# Patient Record
Sex: FEMALE | Race: BLACK OR AFRICAN AMERICAN | NOT HISPANIC OR LATINO | Employment: UNEMPLOYED | URBAN - METROPOLITAN AREA
[De-identification: names, ages, dates, MRNs, and addresses within clinical notes are randomized per-mention and may not be internally consistent; named-entity substitution may affect disease eponyms.]

---

## 2023-01-25 ENCOUNTER — OFFICE VISIT (OUTPATIENT)
Dept: OBSTETRICS AND GYNECOLOGY | Facility: CLINIC | Age: 23
End: 2023-01-25
Payer: MEDICAID

## 2023-01-25 VITALS
BODY MASS INDEX: 41.83 KG/M2 | WEIGHT: 245 LBS | SYSTOLIC BLOOD PRESSURE: 128 MMHG | DIASTOLIC BLOOD PRESSURE: 66 MMHG | HEART RATE: 99 BPM | HEIGHT: 64 IN

## 2023-01-25 DIAGNOSIS — Z28.39 RUBELLA NON-IMMUNE STATUS, ANTEPARTUM: ICD-10-CM

## 2023-01-25 DIAGNOSIS — Z3A.17 17 WEEKS GESTATION OF PREGNANCY: Primary | ICD-10-CM

## 2023-01-25 DIAGNOSIS — N89.8 VAGINAL LESION: ICD-10-CM

## 2023-01-25 DIAGNOSIS — O09.899 RUBELLA NON-IMMUNE STATUS, ANTEPARTUM: ICD-10-CM

## 2023-01-25 DIAGNOSIS — O09.30 INSUFFICIENT ANTEPARTUM CARE: ICD-10-CM

## 2023-01-25 LAB
AMPHET+METHAMPHET UR QL: NEGATIVE
BARBITURATES UR QL SCN>200 NG/ML: NEGATIVE
BENZODIAZ UR QL SCN>200 NG/ML: NEGATIVE
BZE UR QL SCN: NEGATIVE
CANNABINOIDS UR QL SCN: ABNORMAL
CREAT UR-MCNC: 166.8 MG/DL (ref 15–325)
METHADONE UR QL SCN>300 NG/ML: NEGATIVE
OPIATES UR QL SCN: NEGATIVE
PCP UR QL SCN>25 NG/ML: NEGATIVE
TOXICOLOGY INFORMATION: ABNORMAL

## 2023-01-25 PROCEDURE — 3008F PR BODY MASS INDEX (BMI) DOCUMENTED: ICD-10-PCS | Mod: CPTII,S$GLB,,

## 2023-01-25 PROCEDURE — 86694 HERPES SIMPLEX NES ANTBDY: CPT

## 2023-01-25 PROCEDURE — 80307 DRUG TEST PRSMV CHEM ANLYZR: CPT

## 2023-01-25 PROCEDURE — 3078F DIAST BP <80 MM HG: CPT | Mod: CPTII,S$GLB,,

## 2023-01-25 PROCEDURE — 3008F BODY MASS INDEX DOCD: CPT | Mod: CPTII,S$GLB,,

## 2023-01-25 PROCEDURE — 99203 PR OFFICE/OUTPT VISIT, NEW, LEVL III, 30-44 MIN: ICD-10-PCS | Mod: TH,S$GLB,,

## 2023-01-25 PROCEDURE — 82105 ALPHA-FETOPROTEIN SERUM: CPT

## 2023-01-25 PROCEDURE — 3074F PR MOST RECENT SYSTOLIC BLOOD PRESSURE < 130 MM HG: ICD-10-PCS | Mod: CPTII,S$GLB,,

## 2023-01-25 PROCEDURE — 80074 ACUTE HEPATITIS PANEL: CPT

## 2023-01-25 PROCEDURE — 86696 HERPES SIMPLEX TYPE 2 TEST: CPT

## 2023-01-25 PROCEDURE — 3074F SYST BP LT 130 MM HG: CPT | Mod: CPTII,S$GLB,,

## 2023-01-25 PROCEDURE — 85660 RBC SICKLE CELL TEST: CPT

## 2023-01-25 PROCEDURE — 87086 URINE CULTURE/COLONY COUNT: CPT

## 2023-01-25 PROCEDURE — 99203 OFFICE O/P NEW LOW 30 MIN: CPT | Mod: TH,S$GLB,,

## 2023-01-25 PROCEDURE — 86592 SYPHILIS TEST NON-TREP QUAL: CPT

## 2023-01-25 PROCEDURE — 3078F PR MOST RECENT DIASTOLIC BLOOD PRESSURE < 80 MM HG: ICD-10-PCS | Mod: CPTII,S$GLB,,

## 2023-01-25 PROCEDURE — 86762 RUBELLA ANTIBODY: CPT

## 2023-01-25 PROCEDURE — 87591 N.GONORRHOEAE DNA AMP PROB: CPT

## 2023-01-25 PROCEDURE — 1159F PR MEDICATION LIST DOCUMENTED IN MEDICAL RECORD: ICD-10-PCS | Mod: CPTII,S$GLB,,

## 2023-01-25 PROCEDURE — 1159F MED LIST DOCD IN RCRD: CPT | Mod: CPTII,S$GLB,,

## 2023-01-25 PROCEDURE — 87389 HIV-1 AG W/HIV-1&-2 AB AG IA: CPT

## 2023-01-25 NOTE — PROGRESS NOTES
"2023  23 y.o. 17w2d per u/s at 8 weeks EGA with [facility]. ROGER 2023. Dating u/s report requested at today's visit.   OB History    Para Term  AB Living   1             SAB IAB Ectopic Multiple Live Births                  # Outcome Date GA Lbr Bennie/2nd Weight Sex Delivery Anes PTL Lv   1 Current              Pt reports she had ultrasound with [facility] when she was 8 weeks EGA. She was provided ROGER of 2023. PIERRE completed to obtain ultrasound report.     Here for scheduled Initial OB visit. Doing well.  No lof/brvb, dysuria, fever/chills, nausea or emesis. No S&S of pre eclampsia or COIVD 19. Good FM noted. No cramps/regular contractions. Calm, pleasant, NAD. ROS negative with exception of aforementioned:    Patient reports recurrent vaginal bumps that come and go. Reports as not painful. No vaginal bumps at this time.  We reviewed this could likely be HSV. Discussed need for serum HSV labs. If positive patient will need suppression therapy starting approx 34-36 weeks.     Allergies:  None    PMH:  Denies major medical illness or injury    Surghx:  None    OBHX:    Late entry to care at 17 weeks  BMI 40 at Initial OB. Plan growth 32 weeks; weekly BPP beginning at 34 weeks.   Reports recurrent "vaginal bumps" that come and go. 2 episodes this pregnancy. Discussed need to collect HSV labs today. Pt verbalizes understanding.   Rubella Non-immune    Review of Systems:  General ROS: negative for headache or visual changes  Breast ROS: negative for breast lumps  Gastrointestinal ROS: negative for constipation, diarrhea or nausea/vomiting  Musculoskeletal ROS: negative for pain in joints or swelling in face or hands.   Neurological ROS: negative for - headaches, numbness/tingling or visual changes      Physical Exam:  /66   Pulse 99   Ht 5' 4" (1.626 m)   Wt 111.1 kg (245 lb)   LMP 2022 (Approximate)   BMI 42.05 kg/m²   FHT:  " 140s    Constitutional: She is oriented to person, place, and time. She appears well-developed and well-nourished. No distress.   Pulmonary/Chest: Effort normal. No respiratory distress  Abdominal: Soft, gravid, nontender. No rebound and no guarding. Fundal Height:  S=D  Genitourinary: Deferred   Musculoskeletal: Normal range of motion. Minimal peripheral edema.   Neurological: She is alert and oriented to person, place, and time. Coordination normal. Gait smooth and steady  Skin: Skin is warm and dry. She is not diaphoretic.  Psychiatric: She has a normal mood and affect.      Assessment:   23 y.o., at 17w2d  Patient Active Problem List   Diagnosis    17 weeks gestation of pregnancy    Rubella non-immune status, antepartum    Vaginal lesion    Insufficient antepartum care     No current outpatient medications on file prior to visit.     No current facility-administered medications on file prior to visit.       Plan:  Oriented to Midwifery Care.   S&S of SAB, PTL/PPROM.  Continue home meds/daily PNV.  OB panel + NIPT + AFP today.  Anatomy u/s ordered.   [facility]   JANET 4 weeks.

## 2023-01-27 DIAGNOSIS — O99.019 ANTEPARTUM ANEMIA: Primary | ICD-10-CM

## 2023-01-27 PROBLEM — N89.8 VAGINAL LESION: Status: ACTIVE | Noted: 2023-01-27

## 2023-01-27 PROBLEM — Z28.39 RUBELLA NON-IMMUNE STATUS, ANTEPARTUM: Status: ACTIVE | Noted: 2023-01-27

## 2023-01-27 PROBLEM — O09.899 RUBELLA NON-IMMUNE STATUS, ANTEPARTUM: Status: ACTIVE | Noted: 2023-01-27

## 2023-01-27 PROBLEM — O09.30 INSUFFICIENT ANTEPARTUM CARE: Status: ACTIVE | Noted: 2023-01-27

## 2023-01-27 LAB
BACTERIA UR CULT: NORMAL
BACTERIA UR CULT: NORMAL

## 2023-01-27 RX ORDER — FERROUS SULFATE 325(65) MG
325 TABLET, DELAYED RELEASE (ENTERIC COATED) ORAL DAILY
Qty: 30 TABLET | Refills: 11 | Status: SHIPPED | OUTPATIENT
Start: 2023-01-27 | End: 2023-03-13 | Stop reason: SDUPTHER

## 2023-01-28 LAB
C TRACH DNA SPEC QL NAA+PROBE: NOT DETECTED
N GONORRHOEA DNA SPEC QL NAA+PROBE: NOT DETECTED

## 2023-01-30 ENCOUNTER — TELEPHONE (OUTPATIENT)
Dept: OBSTETRICS AND GYNECOLOGY | Facility: CLINIC | Age: 23
End: 2023-01-30
Payer: MEDICAID

## 2023-01-30 DIAGNOSIS — B00.9 HUMAN HERPES SIMPLEX VIRUS TYPE 1 (HSV-1) DNA DETECTED: ICD-10-CM

## 2023-01-30 DIAGNOSIS — R76.8 HSV-2 SEROPOSITIVE: Primary | ICD-10-CM

## 2023-01-30 RX ORDER — VALACYCLOVIR HYDROCHLORIDE 500 MG/1
500 TABLET, FILM COATED ORAL 2 TIMES DAILY
Qty: 60 TABLET | Refills: 11 | Status: SHIPPED | OUTPATIENT
Start: 2023-01-30 | End: 2023-03-13 | Stop reason: SDUPTHER

## 2023-01-30 NOTE — TELEPHONE ENCOUNTER
I spoke with patient on the phone about +HSV 1&2 diagnosis. Pt very sad and distraught on the phone. We discussed HSV is a dormant virus that often presents in times of stress, low immunity such as pregnancy. Modes of transmission discussed. Treatment and suppression therapy reviewed. Pt desires to start suppression therapy today. Pt verbalizes understanding of teaching.   Pt still feeling upset, states this is her first relationship and she has been tested prior with negative results.   Reviewed HSV status often unknown by most people and HSV highly common virus. I encouraged patient to speak with friend or family member she trusts. Reviewed patient can reach out if she desires to discuss further or has any questions.

## 2023-01-30 NOTE — TELEPHONE ENCOUNTER
Pt called inquiring of HSV results. Not yet reviewed by provider. Pt notified of message sent and VU.

## 2023-02-10 ENCOUNTER — TELEPHONE (OUTPATIENT)
Dept: OBSTETRICS AND GYNECOLOGY | Facility: CLINIC | Age: 23
End: 2023-02-10
Payer: MEDICAID

## 2023-02-10 ENCOUNTER — PATIENT MESSAGE (OUTPATIENT)
Dept: OBSTETRICS AND GYNECOLOGY | Facility: CLINIC | Age: 23
End: 2023-02-10
Payer: MEDICAID

## 2023-02-10 NOTE — TELEPHONE ENCOUNTER
I called patient to review questions, no answer, voice mail box disabled. Pt portal message sent to patient.

## 2023-02-10 NOTE — TELEPHONE ENCOUNTER
----- Message from Lakeshia Palacios MA sent at 2/8/2023  4:31 PM CST -----  Please advise  ----- Message -----  From: Junaid Solorio  Sent: 2/8/2023   3:11 PM CST  To: Li [facility] Staff    Type: Needs Medical Advice  Who Called:  Pt  Symptoms (please be specific):  RX side effects   How long has patient had these symptoms:  2 days  Pharmacy name and phone #:    Best Call Back Number: [phone]     Additional Information: Sts that she just got the pills for her Herpes and is experiencing cramping a lot and its just her 2nd day taking them.  Wants to make sure that is okay and it won't affect the baby.  Also has a few other questions.  Please advise- - thank you

## 2023-02-24 ENCOUNTER — PATIENT MESSAGE (OUTPATIENT)
Dept: OBSTETRICS AND GYNECOLOGY | Facility: CLINIC | Age: 23
End: 2023-02-24
Payer: MEDICAID

## 2023-02-28 ENCOUNTER — PROCEDURE VISIT (OUTPATIENT)
Dept: MATERNAL FETAL MEDICINE | Facility: CLINIC | Age: 23
End: 2023-02-28
Payer: MEDICAID

## 2023-02-28 DIAGNOSIS — O09.30 INSUFFICIENT ANTEPARTUM CARE: ICD-10-CM

## 2023-02-28 DIAGNOSIS — Z3A.17 17 WEEKS GESTATION OF PREGNANCY: ICD-10-CM

## 2023-02-28 PROCEDURE — 76811 OB US DETAILED SNGL FETUS: CPT | Mod: S$GLB,,, | Performed by: OBSTETRICS & GYNECOLOGY

## 2023-02-28 PROCEDURE — 76811 US OB/GYN PROCEDURE (VIEWPOINT): ICD-10-PCS | Mod: S$GLB,,, | Performed by: OBSTETRICS & GYNECOLOGY

## 2023-03-01 ENCOUNTER — TELEPHONE (OUTPATIENT)
Dept: OBSTETRICS AND GYNECOLOGY | Facility: CLINIC | Age: 23
End: 2023-03-01
Payer: MEDICAID

## 2023-03-01 DIAGNOSIS — Z36.2 ENCOUNTER FOR FOLLOW-UP ULTRASOUND OF FETAL ANATOMY: Primary | ICD-10-CM

## 2023-03-01 DIAGNOSIS — O09.292: ICD-10-CM

## 2023-03-01 NOTE — PROGRESS NOTES
I spoke with patient on the phone. We reviewed the following. Fetal growth on low end of normal and cerebellum lag by 14 days. Suboptimal anatomy reviewed. We also talked about recommendation for follow up ultrasound with M consult in 3 weeks. Pt verbalizes understanding and is agreeable to plan of care. Pt scheduled for JANET 3/3/23 at 1115 am. Pt has been contacted by [facility] and scheduled for u/s 3/29/2023.

## 2023-03-01 NOTE — TELEPHONE ENCOUNTER
I spoke with patient on the phone. We reviewed the following. Fetal growth on low end of normal and cerebellum lag by 14 days. Suboptimal anatomy reviewed. We also talked about recommendation for follow up ultrasound with M consult in 3 weeks. Pt verbalizes understanding and is agreeable to plan of care. Pt scheduled for JANET 3/3/23 at 1115 am. Pt has been contacted by [facility] and scheduled for u/s 3/29/2023.

## 2023-03-02 NOTE — PROGRESS NOTES
I messaged Ms. Jefferson as I saw patient does not have ultrasound appointment for 4 weeks and scheduled MFM consult at that time. Ideally, suggested 3 weeks for growth as per my report.  Given patient's gestational age, if patient would like to discuss borderline findings and/or would consider amniocentesis etc, I would recommend MFM consultation within the next week or so.  Advised  to notify MFM office if desire change in appointment. Appointment may need to be in [place] for consultation or there are some virtual options.

## 2023-03-03 ENCOUNTER — TELEPHONE (OUTPATIENT)
Dept: MATERNAL FETAL MEDICINE | Facility: CLINIC | Age: 23
End: 2023-03-03
Payer: MEDICAID

## 2023-03-03 ENCOUNTER — PATIENT MESSAGE (OUTPATIENT)
Dept: MATERNAL FETAL MEDICINE | Facility: CLINIC | Age: 23
End: 2023-03-03
Payer: MEDICAID

## 2023-03-03 NOTE — TELEPHONE ENCOUNTER
Attempted to contact patient to reschedule her appt with MFM sooner by 1 week per her providers' request. VM box not avail. Will send portal message to patient.

## 2023-03-04 ENCOUNTER — DOCUMENTATION ONLY (OUTPATIENT)
Dept: MATERNAL FETAL MEDICINE | Facility: CLINIC | Age: 23
End: 2023-03-04
Payer: MEDICAID

## 2023-03-04 NOTE — PROGRESS NOTES
Responded to Ms. Jefferson's questions on 3/3/23 regarding ultrasound findings.  Reviewed potential of normal variation and spectrum of of other potential findings/evolution.  Cerebellar size typically preserved in isolated fgr.  Potential benefits/limitations of MFM consultation was discussed.  Amnio is option for all pregnant patients.  Also messaged Ms. Jefferson  3/4/23 to confirm ROGER as we were given stated ROGER.

## 2023-03-06 ENCOUNTER — DOCUMENTATION ONLY (OUTPATIENT)
Dept: MATERNAL FETAL MEDICINE | Facility: CLINIC | Age: 23
End: 2023-03-06
Payer: MEDICAID

## 2023-03-06 ENCOUNTER — PATIENT MESSAGE (OUTPATIENT)
Dept: OBSTETRICS AND GYNECOLOGY | Facility: CLINIC | Age: 23
End: 2023-03-06
Payer: MEDICAID

## 2023-03-06 NOTE — PROGRESS NOTES
ROGER amended to 7/10/23 per Esme Jefferson based on outside first trimester scan.  [facility]point report from 2/28/23 altered to reflect this.  Primary ob to discuss with patient.

## 2023-03-13 ENCOUNTER — ROUTINE PRENATAL (OUTPATIENT)
Dept: OBSTETRICS AND GYNECOLOGY | Facility: CLINIC | Age: 23
End: 2023-03-13
Payer: MEDICAID

## 2023-03-13 VITALS
WEIGHT: 250.88 LBS | DIASTOLIC BLOOD PRESSURE: 69 MMHG | BODY MASS INDEX: 43.07 KG/M2 | SYSTOLIC BLOOD PRESSURE: 109 MMHG

## 2023-03-13 DIAGNOSIS — Z3A.23 23 WEEKS GESTATION OF PREGNANCY: Primary | ICD-10-CM

## 2023-03-13 DIAGNOSIS — Z28.39 RUBELLA NON-IMMUNE STATUS, ANTEPARTUM: ICD-10-CM

## 2023-03-13 DIAGNOSIS — B00.9 HUMAN HERPES SIMPLEX VIRUS TYPE 1 (HSV-1) DNA DETECTED: ICD-10-CM

## 2023-03-13 DIAGNOSIS — R76.8 HSV-2 SEROPOSITIVE: ICD-10-CM

## 2023-03-13 DIAGNOSIS — O09.899 RUBELLA NON-IMMUNE STATUS, ANTEPARTUM: ICD-10-CM

## 2023-03-13 DIAGNOSIS — O09.30 INSUFFICIENT ANTEPARTUM CARE: ICD-10-CM

## 2023-03-13 DIAGNOSIS — J03.90 TONSILLITIS: ICD-10-CM

## 2023-03-13 DIAGNOSIS — D75.1 POLYCYTHEMIA: ICD-10-CM

## 2023-03-13 DIAGNOSIS — O99.019 ANTEPARTUM ANEMIA: ICD-10-CM

## 2023-03-13 PROBLEM — Z3A.17 17 WEEKS GESTATION OF PREGNANCY: Status: RESOLVED | Noted: 2023-01-25 | Resolved: 2023-03-13

## 2023-03-13 PROBLEM — N89.8 VAGINAL LESION: Status: RESOLVED | Noted: 2023-01-27 | Resolved: 2023-03-13

## 2023-03-13 PROCEDURE — 99214 OFFICE O/P EST MOD 30 MIN: CPT | Mod: TH,S$GLB,,

## 2023-03-13 PROCEDURE — 99214 PR OFFICE/OUTPT VISIT, EST, LEVL IV, 30-39 MIN: ICD-10-PCS | Mod: TH,S$GLB,,

## 2023-03-13 RX ORDER — PREDNISONE 10 MG/1
10 TABLET ORAL DAILY
Qty: 10 TABLET | Refills: 0 | Status: SHIPPED | OUTPATIENT
Start: 2023-03-13 | End: 2023-03-23

## 2023-03-13 RX ORDER — VALACYCLOVIR HYDROCHLORIDE 500 MG/1
500 TABLET, FILM COATED ORAL 2 TIMES DAILY
Qty: 60 TABLET | Refills: 11 | Status: SHIPPED | OUTPATIENT
Start: 2023-03-13 | End: 2023-09-13 | Stop reason: SDUPTHER

## 2023-03-13 RX ORDER — FERROUS SULFATE 325(65) MG
325 TABLET, DELAYED RELEASE (ENTERIC COATED) ORAL DAILY
Qty: 30 TABLET | Refills: 11 | Status: SHIPPED | OUTPATIENT
Start: 2023-03-13 | End: 2024-03-12

## 2023-03-13 RX ORDER — ASPIRIN 81 MG/1
81 TABLET ORAL DAILY
Qty: 30 TABLET | Refills: 11 | Status: SHIPPED | OUTPATIENT
Start: 2023-03-13 | End: 2023-08-10 | Stop reason: ALTCHOICE

## 2023-03-13 NOTE — PROGRESS NOTES
3/13/2023  23 y.o. 23w0d per u/s at 9w3d weeks EGA with [facility]. ROGER 07/10/2023. Dating u/s report scanned into media.   OB History    Para Term  AB Living   1             SAB IAB Ectopic Multiple Live Births                  # Outcome Date GA Lbr Bennie/2nd Weight Sex Delivery Anes PTL Lv   1 Current              Here for scheduled JANET visit. Doing well.  No lof/brvb, dysuria, fever/chills, nausea or emesis. No S&S of pre eclampsia or COIVD 19. Good FM noted. No cramps/regular contractions. Calm, pleasant, NAD. ROS negative with exception of aforementioned:    Patient reports recurrent vaginal bumps that come and go at her Initial OB visit. HSV serum labs positive for HSV 1&2. Pt taking suppressive valtrex.     Anatomy and growth u/s completed on 2023. Fetal growth on low end of normal and cerebellum measuring 9 days behind. Per MFM follow up growth and consult in 3 weeks. Reviewed with patient and this has been scheduled for 2023.    Pt reports history of tonsilitis. Pt states she feels like her tonsils are swollen right now. This has been exacerbated by pregnancy. Pt states she had cold last month. Discussed oral steroids and referral to ENT. Pt agreeable to plan of care.     Allergies:  None    PMH:  Denies major medical illness or injury    Surghx:  None    OBHX:    Late entry to care at 17 weeks  BMI 40 at Initial OB. Plan growth 32 weeks; weekly BPP beginning at 34 weeks.   HSV 1&2. On suppression therapy.  Rubella Non-immune.    LABS:  O POS/ABS Neg  CBC 10.2/32.7/484  Rubella Non-Immune  Sickle Cell Neg  HIV Neg  Hep A B C Neg  RPR Non-Reactive  HSV serum + 1&2    NIPT Low Risk x 3; Female  AFP Negative    Urine Cx No Growth  GC/CHL Neg  UDS + THC    Review of Systems:  General ROS: negative for headache or visual changes  Breast ROS: negative for breast lumps  Gastrointestinal ROS: negative for constipation, diarrhea or nausea/vomiting  Musculoskeletal ROS:  negative for pain in joints or swelling in face or hands.   Neurological ROS: negative for - headaches, numbness/tingling or visual changes      Physical Exam:  /69   Wt 113.8 kg (250 lb 14.4 oz)   LMP 09/26/2022 (Approximate)   BMI 43.07 kg/m²   FHT: Fetal Heart Rate: 130s  Urine Dip: neg/neg    Constitutional: She is oriented to person, place, and time. She appears well-developed and well-nourished. No distress.   Pulmonary/Chest: Effort normal. No respiratory distress  Abdominal: Soft, gravid, nontender. No rebound and no guarding. Fundal Height: Fundal Height (cm): 24 cmS=D  Genitourinary: Deferred   Musculoskeletal: Normal range of motion. Minimal peripheral edema.   Neurological: She is alert and oriented to person, place, and time. Coordination normal. Gait smooth and steady  Skin: Skin is warm and dry. She is not diaphoretic.  Psychiatric: She has a normal mood and affect.      Assessment:   23 y.o., at 23w0d  Patient Active Problem List   Diagnosis    Rubella non-immune status, antepartum    Insufficient antepartum care    23 weeks gestation of pregnancy    Polycythemia    BMI 40.0-44.9, adult    Antepartum anemia    Human herpes simplex virus type 1 (HSV-1) DNA detected     Current Outpatient Medications on File Prior to Visit   Medication Sig Dispense Refill    [DISCONTINUED] ferrous sulfate 325 (65 FE) MG EC tablet Take 1 tablet (325 mg total) by mouth once daily. 30 tablet 11    [DISCONTINUED] valACYclovir (VALTREX) 500 MG tablet Take 1 tablet (500 mg total) by mouth 2 (two) times daily. 60 tablet 11     No current facility-administered medications on file prior to visit.       Plan:  Oriented to Midwifery Care.   S&S of  PTL/PPROM.  Continue home meds/daily PNV.  ROGER 07/10/2023 per U/S at 9w3d.  NIPT Low Risk x 3; Female. AFP Neg.  Follow up growth u/s and MFM consult on 03/22/2023.  GTT/CBC Next Visit.   JANET 4 weeks.

## 2023-03-17 DIAGNOSIS — J03.90 TONSILLITIS: Primary | ICD-10-CM

## 2023-03-22 ENCOUNTER — OFFICE VISIT (OUTPATIENT)
Dept: MATERNAL FETAL MEDICINE | Facility: CLINIC | Age: 23
End: 2023-03-22
Payer: MEDICAID

## 2023-03-22 ENCOUNTER — PROCEDURE VISIT (OUTPATIENT)
Dept: MATERNAL FETAL MEDICINE | Facility: CLINIC | Age: 23
End: 2023-03-22
Payer: MEDICAID

## 2023-03-22 VITALS
WEIGHT: 250.25 LBS | BODY MASS INDEX: 42.72 KG/M2 | SYSTOLIC BLOOD PRESSURE: 137 MMHG | DIASTOLIC BLOOD PRESSURE: 80 MMHG | HEIGHT: 64 IN

## 2023-03-22 DIAGNOSIS — Z36.89 ENCOUNTER FOR ULTRASOUND TO ASSESS FETAL GROWTH: Primary | ICD-10-CM

## 2023-03-22 DIAGNOSIS — Z36.2 ENCOUNTER FOR FOLLOW-UP ULTRASOUND OF FETAL ANATOMY: ICD-10-CM

## 2023-03-22 DIAGNOSIS — Z3A.24 24 WEEKS GESTATION OF PREGNANCY: Primary | ICD-10-CM

## 2023-03-22 DIAGNOSIS — O09.292: ICD-10-CM

## 2023-03-22 PROCEDURE — 1160F PR REVIEW ALL MEDS BY PRESCRIBER/CLIN PHARMACIST DOCUMENTED: ICD-10-PCS | Mod: CPTII,S$GLB,, | Performed by: OBSTETRICS & GYNECOLOGY

## 2023-03-22 PROCEDURE — 76816 PR  US,PREGNANT UTERUS,F/U,TRANSABD APP: ICD-10-PCS | Mod: S$GLB,,, | Performed by: OBSTETRICS & GYNECOLOGY

## 2023-03-22 PROCEDURE — 3008F BODY MASS INDEX DOCD: CPT | Mod: CPTII,S$GLB,, | Performed by: OBSTETRICS & GYNECOLOGY

## 2023-03-22 PROCEDURE — 1159F PR MEDICATION LIST DOCUMENTED IN MEDICAL RECORD: ICD-10-PCS | Mod: CPTII,S$GLB,, | Performed by: OBSTETRICS & GYNECOLOGY

## 2023-03-22 PROCEDURE — 3079F DIAST BP 80-89 MM HG: CPT | Mod: CPTII,S$GLB,, | Performed by: OBSTETRICS & GYNECOLOGY

## 2023-03-22 PROCEDURE — 76816 OB US FOLLOW-UP PER FETUS: CPT | Mod: S$GLB,,, | Performed by: OBSTETRICS & GYNECOLOGY

## 2023-03-22 PROCEDURE — 3075F SYST BP GE 130 - 139MM HG: CPT | Mod: CPTII,S$GLB,, | Performed by: OBSTETRICS & GYNECOLOGY

## 2023-03-22 PROCEDURE — 1160F RVW MEDS BY RX/DR IN RCRD: CPT | Mod: CPTII,S$GLB,, | Performed by: OBSTETRICS & GYNECOLOGY

## 2023-03-22 PROCEDURE — 3079F PR MOST RECENT DIASTOLIC BLOOD PRESSURE 80-89 MM HG: ICD-10-PCS | Mod: CPTII,S$GLB,, | Performed by: OBSTETRICS & GYNECOLOGY

## 2023-03-22 PROCEDURE — 99499 NO LOS: ICD-10-PCS | Mod: S$GLB,,, | Performed by: OBSTETRICS & GYNECOLOGY

## 2023-03-22 PROCEDURE — 1159F MED LIST DOCD IN RCRD: CPT | Mod: CPTII,S$GLB,, | Performed by: OBSTETRICS & GYNECOLOGY

## 2023-03-22 PROCEDURE — 3008F PR BODY MASS INDEX (BMI) DOCUMENTED: ICD-10-PCS | Mod: CPTII,S$GLB,, | Performed by: OBSTETRICS & GYNECOLOGY

## 2023-03-22 PROCEDURE — 3075F PR MOST RECENT SYSTOLIC BLOOD PRESS GE 130-139MM HG: ICD-10-PCS | Mod: CPTII,S$GLB,, | Performed by: OBSTETRICS & GYNECOLOGY

## 2023-03-22 PROCEDURE — 99499 UNLISTED E&M SERVICE: CPT | Mod: S$GLB,,, | Performed by: OBSTETRICS & GYNECOLOGY

## 2023-04-03 ENCOUNTER — PATIENT MESSAGE (OUTPATIENT)
Dept: OTHER | Facility: OTHER | Age: 23
End: 2023-04-03
Payer: MEDICAID

## 2023-04-10 ENCOUNTER — LAB VISIT (OUTPATIENT)
Dept: LAB | Facility: CLINIC | Age: 23
End: 2023-04-10
Payer: MEDICAID

## 2023-04-10 ENCOUNTER — ROUTINE PRENATAL (OUTPATIENT)
Dept: OBSTETRICS AND GYNECOLOGY | Facility: CLINIC | Age: 23
End: 2023-04-10
Payer: MEDICAID

## 2023-04-10 VITALS
DIASTOLIC BLOOD PRESSURE: 67 MMHG | SYSTOLIC BLOOD PRESSURE: 119 MMHG | WEIGHT: 253.69 LBS | BODY MASS INDEX: 43.55 KG/M2

## 2023-04-10 DIAGNOSIS — N89.8 VAGINAL DISCHARGE: ICD-10-CM

## 2023-04-10 DIAGNOSIS — O09.899 RUBELLA NON-IMMUNE STATUS, ANTEPARTUM: ICD-10-CM

## 2023-04-10 DIAGNOSIS — O09.30 INSUFFICIENT ANTEPARTUM CARE: ICD-10-CM

## 2023-04-10 DIAGNOSIS — Z3A.27 27 WEEKS GESTATION OF PREGNANCY: Primary | ICD-10-CM

## 2023-04-10 DIAGNOSIS — Z28.39 RUBELLA NON-IMMUNE STATUS, ANTEPARTUM: ICD-10-CM

## 2023-04-10 DIAGNOSIS — O99.019 ANTEPARTUM ANEMIA: ICD-10-CM

## 2023-04-10 DIAGNOSIS — D75.1 POLYCYTHEMIA: ICD-10-CM

## 2023-04-10 DIAGNOSIS — B00.9 HUMAN HERPES SIMPLEX VIRUS TYPE 1 (HSV-1) DNA DETECTED: ICD-10-CM

## 2023-04-10 DIAGNOSIS — N89.8 VAGINAL ODOR: ICD-10-CM

## 2023-04-10 DIAGNOSIS — R30.0 DYSURIA: ICD-10-CM

## 2023-04-10 DIAGNOSIS — Z3A.23 23 WEEKS GESTATION OF PREGNANCY: ICD-10-CM

## 2023-04-10 LAB
BASOPHILS # BLD AUTO: 0.02 K/UL (ref 0–0.2)
BASOPHILS NFR BLD: 0.3 % (ref 0–1.9)
DIFFERENTIAL METHOD: ABNORMAL
EOSINOPHIL # BLD AUTO: 0.1 K/UL (ref 0–0.5)
EOSINOPHIL NFR BLD: 1.6 % (ref 0–8)
ERYTHROCYTE [DISTWIDTH] IN BLOOD BY AUTOMATED COUNT: 15.1 % (ref 11.5–14.5)
GLUCOSE SERPL-MCNC: 109 MG/DL (ref 70–140)
HCT VFR BLD AUTO: 34.1 % (ref 37–48.5)
HGB BLD-MCNC: 11 G/DL (ref 12–16)
IMM GRANULOCYTES # BLD AUTO: 0.03 K/UL (ref 0–0.04)
IMM GRANULOCYTES NFR BLD AUTO: 0.4 % (ref 0–0.5)
LYMPHOCYTES # BLD AUTO: 1.7 K/UL (ref 1–4.8)
LYMPHOCYTES NFR BLD: 22.5 % (ref 18–48)
MCH RBC QN AUTO: 27.6 PG (ref 27–31)
MCHC RBC AUTO-ENTMCNC: 32.3 G/DL (ref 32–36)
MCV RBC AUTO: 86 FL (ref 82–98)
MONOCYTES # BLD AUTO: 0.6 K/UL (ref 0.3–1)
MONOCYTES NFR BLD: 7.8 % (ref 4–15)
NEUTROPHILS # BLD AUTO: 5 K/UL (ref 1.8–7.7)
NEUTROPHILS NFR BLD: 67.4 % (ref 38–73)
NRBC BLD-RTO: 0 /100 WBC
PLATELET # BLD AUTO: 405 K/UL (ref 150–450)
PMV BLD AUTO: 9.2 FL (ref 9.2–12.9)
RBC # BLD AUTO: 3.99 M/UL (ref 4–5.4)
WBC # BLD AUTO: 7.46 K/UL (ref 3.9–12.7)

## 2023-04-10 PROCEDURE — 99213 OFFICE O/P EST LOW 20 MIN: CPT | Mod: TH,S$GLB,,

## 2023-04-10 PROCEDURE — 82950 GLUCOSE TEST: CPT

## 2023-04-10 PROCEDURE — 81514 NFCT DS BV&VAGINITIS DNA ALG: CPT

## 2023-04-10 PROCEDURE — 87086 URINE CULTURE/COLONY COUNT: CPT

## 2023-04-10 PROCEDURE — 36415 PR COLLECTION VENOUS BLOOD,VENIPUNCTURE: ICD-10-PCS | Mod: ,,, | Performed by: STUDENT IN AN ORGANIZED HEALTH CARE EDUCATION/TRAINING PROGRAM

## 2023-04-10 PROCEDURE — 87591 N.GONORRHOEAE DNA AMP PROB: CPT

## 2023-04-10 PROCEDURE — 85025 COMPLETE CBC W/AUTO DIFF WBC: CPT

## 2023-04-10 PROCEDURE — 36415 COLL VENOUS BLD VENIPUNCTURE: CPT | Mod: ,,, | Performed by: STUDENT IN AN ORGANIZED HEALTH CARE EDUCATION/TRAINING PROGRAM

## 2023-04-10 PROCEDURE — 99213 PR OFFICE/OUTPT VISIT, EST, LEVL III, 20-29 MIN: ICD-10-PCS | Mod: TH,S$GLB,,

## 2023-04-11 DIAGNOSIS — J03.90 TONSILLITIS: Primary | ICD-10-CM

## 2023-04-11 LAB
BACTERIA UR CULT: NO GROWTH
C TRACH DNA SPEC QL NAA+PROBE: NOT DETECTED
N GONORRHOEA DNA SPEC QL NAA+PROBE: NOT DETECTED

## 2023-04-12 DIAGNOSIS — B37.9 YEAST INFECTION: ICD-10-CM

## 2023-04-12 DIAGNOSIS — N76.0 BACTERIAL VAGINOSIS: Primary | ICD-10-CM

## 2023-04-12 DIAGNOSIS — B96.89 BACTERIAL VAGINOSIS: Primary | ICD-10-CM

## 2023-04-12 LAB
BACTERIAL VAGINOSIS DNA: POSITIVE
CANDIDA GLABRATA DNA: NEGATIVE
CANDIDA KRUSEI DNA: NEGATIVE
CANDIDA RRNA VAG QL PROBE: POSITIVE
T VAGINALIS RRNA GENITAL QL PROBE: NEGATIVE

## 2023-04-12 RX ORDER — METRONIDAZOLE 500 MG/1
500 TABLET ORAL EVERY 12 HOURS
Qty: 14 TABLET | Refills: 0 | Status: SHIPPED | OUTPATIENT
Start: 2023-04-12 | End: 2023-04-19

## 2023-04-12 RX ORDER — FLUCONAZOLE 200 MG/1
200 TABLET ORAL DAILY
Qty: 3 TABLET | Refills: 0 | Status: SHIPPED | OUTPATIENT
Start: 2023-04-12 | End: 2023-04-15

## 2023-04-12 NOTE — PROGRESS NOTES
+BV and yeast infection reviewed with patient via telephone call. Rx for flagyl and diflucan reviewed. Patient will pick rx today.

## 2023-04-17 ENCOUNTER — PATIENT MESSAGE (OUTPATIENT)
Dept: OTHER | Facility: OTHER | Age: 23
End: 2023-04-17
Payer: MEDICAID

## 2023-04-19 LAB
FINAL PATHOLOGIC DIAGNOSIS: NORMAL
Lab: NORMAL

## 2023-05-01 ENCOUNTER — PATIENT MESSAGE (OUTPATIENT)
Dept: OTHER | Facility: OTHER | Age: 23
End: 2023-05-01
Payer: MEDICAID

## 2023-05-15 ENCOUNTER — PATIENT MESSAGE (OUTPATIENT)
Dept: OTHER | Facility: OTHER | Age: 23
End: 2023-05-15
Payer: MEDICAID

## 2023-05-15 ENCOUNTER — PROCEDURE VISIT (OUTPATIENT)
Dept: MATERNAL FETAL MEDICINE | Facility: CLINIC | Age: 23
End: 2023-05-15
Payer: MEDICAID

## 2023-05-15 DIAGNOSIS — Z3A.24 24 WEEKS GESTATION OF PREGNANCY: ICD-10-CM

## 2023-05-15 PROCEDURE — 76816 US OB/GYN PROCEDURE (VIEWPOINT): ICD-10-PCS | Mod: S$GLB,,, | Performed by: OBSTETRICS & GYNECOLOGY

## 2023-05-15 PROCEDURE — 76816 OB US FOLLOW-UP PER FETUS: CPT | Mod: S$GLB,,, | Performed by: OBSTETRICS & GYNECOLOGY

## 2023-05-29 ENCOUNTER — PROCEDURE VISIT (OUTPATIENT)
Dept: MATERNAL FETAL MEDICINE | Facility: CLINIC | Age: 23
End: 2023-05-29
Payer: MEDICAID

## 2023-05-29 DIAGNOSIS — Z3A.27 27 WEEKS GESTATION OF PREGNANCY: ICD-10-CM

## 2023-05-29 PROCEDURE — 76819 US OB/GYN PROCEDURE (VIEWPOINT): ICD-10-PCS | Mod: S$GLB,,, | Performed by: OBSTETRICS & GYNECOLOGY

## 2023-05-29 PROCEDURE — 76819 FETAL BIOPHYS PROFIL W/O NST: CPT | Mod: S$GLB,,, | Performed by: OBSTETRICS & GYNECOLOGY

## 2023-05-31 ENCOUNTER — TELEPHONE (OUTPATIENT)
Dept: FAMILY MEDICINE | Facility: CLINIC | Age: 23
End: 2023-05-31
Payer: MEDICAID

## 2023-05-31 DIAGNOSIS — Z3A.34 34 WEEKS GESTATION OF PREGNANCY: Primary | ICD-10-CM

## 2023-05-31 NOTE — TELEPHONE ENCOUNTER
Pt aware of medication sent to pharmacy.      ----- Message from Esme Jefferson CNM sent at 5/31/2023  3:38 PM CDT -----  Prenatals sent to pharmacy on file  ----- Message -----  From: Mariola Vernon LPN  Sent: 5/31/2023  10:49 AM CDT  To: Esme Jefferson CNM    Pt would like prenatal vitamins called in to pharmacy on file.  Called pt to f/u, pt reschedule appt to a later date.  ----- Message -----  From: Derek Felipe  Sent: 5/31/2023  10:07 AM CDT  To: Li Victoria Staff    Type: Needs Medical Advice  Who Called:  pt  Best Call Back Number: [phone]  Additional Information: pt is calling the office to see about getting prenatal vitamins called in. She would like to speak with the nurse as well. Please call back to advise. Thanks!

## 2023-06-05 ENCOUNTER — PATIENT MESSAGE (OUTPATIENT)
Dept: OTHER | Facility: OTHER | Age: 23
End: 2023-06-05
Payer: MEDICAID

## 2023-06-05 ENCOUNTER — PROCEDURE VISIT (OUTPATIENT)
Dept: MATERNAL FETAL MEDICINE | Facility: CLINIC | Age: 23
End: 2023-06-05
Payer: MEDICAID

## 2023-06-05 DIAGNOSIS — E66.9 OBESITY: Primary | ICD-10-CM

## 2023-06-05 PROCEDURE — 76819 US OB/GYN PROCEDURE (VIEWPOINT): ICD-10-PCS | Mod: S$GLB,,, | Performed by: OBSTETRICS & GYNECOLOGY

## 2023-06-05 PROCEDURE — 76819 FETAL BIOPHYS PROFIL W/O NST: CPT | Mod: S$GLB,,, | Performed by: OBSTETRICS & GYNECOLOGY

## 2023-06-12 ENCOUNTER — PROCEDURE VISIT (OUTPATIENT)
Dept: MATERNAL FETAL MEDICINE | Facility: CLINIC | Age: 23
End: 2023-06-12
Payer: MEDICAID

## 2023-06-12 DIAGNOSIS — E66.9 OBESE: Primary | ICD-10-CM

## 2023-06-12 PROCEDURE — 76816 OB US FOLLOW-UP PER FETUS: CPT | Mod: S$GLB,,, | Performed by: OBSTETRICS & GYNECOLOGY

## 2023-06-12 PROCEDURE — 76819 US OB/GYN PROCEDURE (VIEWPOINT): ICD-10-PCS | Mod: S$GLB,,, | Performed by: OBSTETRICS & GYNECOLOGY

## 2023-06-12 PROCEDURE — 76816 US OB/GYN PROCEDURE (VIEWPOINT): ICD-10-PCS | Mod: S$GLB,,, | Performed by: OBSTETRICS & GYNECOLOGY

## 2023-06-12 PROCEDURE — 76819 FETAL BIOPHYS PROFIL W/O NST: CPT | Mod: S$GLB,,, | Performed by: OBSTETRICS & GYNECOLOGY

## 2023-06-15 ENCOUNTER — ROUTINE PRENATAL (OUTPATIENT)
Dept: OBSTETRICS AND GYNECOLOGY | Facility: CLINIC | Age: 23
End: 2023-06-15
Payer: MEDICAID

## 2023-06-15 VITALS — DIASTOLIC BLOOD PRESSURE: 66 MMHG | SYSTOLIC BLOOD PRESSURE: 122 MMHG | WEIGHT: 265 LBS | BODY MASS INDEX: 45.49 KG/M2

## 2023-06-15 DIAGNOSIS — O09.30 INSUFFICIENT ANTEPARTUM CARE: ICD-10-CM

## 2023-06-15 DIAGNOSIS — Z28.39 RUBELLA NON-IMMUNE STATUS, ANTEPARTUM: ICD-10-CM

## 2023-06-15 DIAGNOSIS — Z3A.36 36 WEEKS GESTATION OF PREGNANCY: Primary | ICD-10-CM

## 2023-06-15 DIAGNOSIS — B00.9 HUMAN HERPES SIMPLEX VIRUS TYPE 1 (HSV-1) DNA DETECTED: ICD-10-CM

## 2023-06-15 DIAGNOSIS — D75.1 POLYCYTHEMIA: ICD-10-CM

## 2023-06-15 DIAGNOSIS — O09.899 RUBELLA NON-IMMUNE STATUS, ANTEPARTUM: ICD-10-CM

## 2023-06-15 PROCEDURE — 87147 CULTURE TYPE IMMUNOLOGIC: CPT

## 2023-06-15 PROCEDURE — 99214 OFFICE O/P EST MOD 30 MIN: CPT | Mod: TH,25,S$GLB,

## 2023-06-15 PROCEDURE — 90471 PR IMMUNIZ ADMIN,1 SINGLE/COMB VAC/TOXOID: ICD-10-PCS | Mod: S$GLB,,,

## 2023-06-15 PROCEDURE — 90471 IMMUNIZATION ADMIN: CPT | Mod: S$GLB,,,

## 2023-06-15 PROCEDURE — 90715 PR TDAP VACCINE >7 YO, IM: ICD-10-PCS | Mod: S$GLB,,,

## 2023-06-15 PROCEDURE — 99214 PR OFFICE/OUTPT VISIT, EST, LEVL IV, 30-39 MIN: ICD-10-PCS | Mod: TH,25,S$GLB,

## 2023-06-15 PROCEDURE — 90715 TDAP VACCINE 7 YRS/> IM: CPT | Mod: S$GLB,,,

## 2023-06-15 PROCEDURE — 87081 CULTURE SCREEN ONLY: CPT

## 2023-06-15 NOTE — PROGRESS NOTES
6/15/2023  23 y.o. 36w3d per u/s at 9w3d weeks EGA with [facility]. ROGER 07/10/2023. Dating u/s report scanned into media.   OB History    Para Term  AB Living   1             SAB IAB Ectopic Multiple Live Births                  # Outcome Date GA Lbr Bennie/2nd Weight Sex Delivery Anes PTL Lv   1 Current              Here for scheduled JANET visit. Doing well.  No lof/brvb, dysuria, fever/chills, nausea or emesis. No S&S of pre eclampsia or COIVD 19. Good FM noted. No cramps/regular contractions. Calm, pleasant, NAD. ROS negative with exception of aforementioned:    Patient reports recurrent vaginal bumps that come and go at her Initial OB visit. HSV serum labs positive for HSV 1&2. Pt taking suppressive valtrex. No outbreaks since beginning valtrex.    Fetal growth at 36w0d EFW 6#10oz 53%; AC 95%. Normal interval growth.       Patient has not been seen since 27 weeks. Patient states she has been resting at home. Importance of regular prenatal care stressed. Reviewed that as patient is now 36 weeks her appts are to occur weekly. Patient verbalizes understanding and will be present for prenatal appts. Patient still receiving weekly BPP due to 34 weeks.     TDAP given on 06/15/2023.      Allergies:  None    PMH:  Denies major medical illness or injury    Surghx:  None    OBHX:    Late entry to care at 17 weeks  BMI 40 at Initial OB. Plan growth 32 weeks; weekly BPP beginning at 34 weeks.   HSV 1&2. On suppression therapy.  Rubella Non-immune.    LABS:  O POS/ABS Neg  CBC 10.2/32.7/484  Rubella Non-Immune  Sickle Cell Neg  HIV Neg  Hep A B C Neg  RPR Non-Reactive  HSV serum + 1&2    NIPT Low Risk x 3; Female  AFP Negative    Urine Cx No Growth  GC/CHL Neg  UDS + THC    Pap, GC/CHL, Vaginosis, Urine Cx today.     CBC at 27 weeks 11.0/34.1/405    GBS and RPR today.    Review of Systems:  General ROS: negative for headache or visual changes  Breast ROS: negative for breast  lumps  Gastrointestinal ROS: negative for constipation, diarrhea or nausea/vomiting  Musculoskeletal ROS: negative for pain in joints or swelling in face or hands.   Neurological ROS: negative for - headaches, numbness/tingling or visual changes      Physical Exam:  /66   Wt 120.2 kg (265 lb)   LMP 09/26/2022 (Approximate)   BMI 45.49 kg/m²   FHT: Fetal Heart Rate: 140s  Urine Dip: neg/neg    Constitutional: She is oriented to person, place, and time. She appears well-developed and well-nourished. No distress.   Pulmonary/Chest: Effort normal. No respiratory distress  Abdominal: Soft, gravid, nontender. No rebound and no guarding. Fundal Height: Fundal Height (cm): 40 cmS=D  Genitourinary: VE: 0/0/-3 no blood or fluid on glove.  Musculoskeletal: Normal range of motion. Minimal peripheral edema.   Neurological: She is alert and oriented to person, place, and time. Coordination normal. Gait smooth and steady  Skin: Skin is warm and dry. She is not diaphoretic.  Psychiatric: She has a normal mood and affect.      Assessment:   23 y.o., at 36w3d  Patient Active Problem List   Diagnosis    Rubella non-immune status, antepartum    Insufficient antepartum care    Polycythemia    BMI 40.0-44.9, adult    Antepartum anemia    Human herpes simplex virus type 1 (HSV-1) DNA detected    27 weeks gestation of pregnancy    Obese     Current Outpatient Medications on File Prior to Visit   Medication Sig Dispense Refill    aspirin (ECOTRIN) 81 MG EC tablet Take 1 tablet (81 mg total) by mouth once daily. 30 tablet 11    ferrous sulfate 325 (65 FE) MG EC tablet Take 1 tablet (325 mg total) by mouth once daily. 30 tablet 11    PNV,calcium 72-iron-folic acid (PRENATAL VITAMIN PLUS LOW IRON) 27 mg iron- 1 mg Tab Take 1 tablet (1 each total) by mouth once daily. 30 tablet 11    valACYclovir (VALTREX) 500 MG tablet Take 1 tablet (500 mg total) by mouth 2 (two) times daily. 60 tablet 11     No current  facility-administered medications on file prior to visit.       Plan:  1. Oriented to Midwifery Care.   2. S&S of  PTL/PPROM.  3. Continue home meds/daily PNV.  4. ROGER 07/10/2023 per U/S at 9w3d.  5. NIPT Low Risk x 3; Female. AFP Neg.  6. Follow up growth u/s at 36 weeks 6#10oz 53%; AC 95%. Normal interval growth. MFM reports no suspicion of fetal macrosomia.   7. GTT/CBC WNL, reviewed and discussed with patient.   8. TDAP given on 06/15/2023.  9. Weekly JANET until delivery.

## 2023-06-16 PROBLEM — Z3A.27 27 WEEKS GESTATION OF PREGNANCY: Status: RESOLVED | Noted: 2023-04-10 | Resolved: 2023-06-16

## 2023-06-16 PROBLEM — Z3A.36 36 WEEKS GESTATION OF PREGNANCY: Status: ACTIVE | Noted: 2023-06-16

## 2023-06-16 PROBLEM — O99.019 ANTEPARTUM ANEMIA: Status: RESOLVED | Noted: 2023-03-13 | Resolved: 2023-06-16

## 2023-06-16 PROBLEM — E66.9 OBESE: Status: RESOLVED | Noted: 2023-06-12 | Resolved: 2023-06-16

## 2023-06-19 ENCOUNTER — PROCEDURE VISIT (OUTPATIENT)
Dept: MATERNAL FETAL MEDICINE | Facility: CLINIC | Age: 23
End: 2023-06-19
Payer: MEDICAID

## 2023-06-19 ENCOUNTER — TELEPHONE (OUTPATIENT)
Dept: OBSTETRICS AND GYNECOLOGY | Facility: CLINIC | Age: 23
End: 2023-06-19
Payer: MEDICAID

## 2023-06-19 ENCOUNTER — PATIENT MESSAGE (OUTPATIENT)
Dept: OBSTETRICS AND GYNECOLOGY | Facility: CLINIC | Age: 23
End: 2023-06-19
Payer: MEDICAID

## 2023-06-19 LAB — BACTERIA SPEC AEROBE CULT: ABNORMAL

## 2023-06-19 PROCEDURE — 76819 FETAL BIOPHYS PROFIL W/O NST: CPT | Mod: S$GLB,,, | Performed by: OBSTETRICS & GYNECOLOGY

## 2023-06-19 PROCEDURE — 76819 US OB/GYN PROCEDURE (VIEWPOINT): ICD-10-PCS | Mod: S$GLB,,, | Performed by: OBSTETRICS & GYNECOLOGY

## 2023-06-19 NOTE — TELEPHONE ENCOUNTER
I called patient back to review her question. No answer and no voice mail box set up. Will send portal message.

## 2023-06-19 NOTE — TELEPHONE ENCOUNTER
----- Message from Slick Eagle MA sent at 6/19/2023  8:52 AM CDT -----  Contact: PT    ----- Message -----  From: Nidia Rosas  Sent: 6/16/2023   3:47 PM CDT  To: Li Victoria Staff    Type:  Needs Medical Advice    Who Called: PT   Would the patient rather a call back or a response via [facility]? CALL  Best Call Back Number: [phone] (work)    Additional Information: PT WOULD LIKE A CALL BACK TO ASK A URGENT QUESTION   THANK YOU

## 2023-06-22 ENCOUNTER — ROUTINE PRENATAL (OUTPATIENT)
Dept: OBSTETRICS AND GYNECOLOGY | Facility: CLINIC | Age: 23
End: 2023-06-22
Payer: MEDICAID

## 2023-06-22 VITALS
BODY MASS INDEX: 45.08 KG/M2 | HEART RATE: 102 BPM | SYSTOLIC BLOOD PRESSURE: 129 MMHG | WEIGHT: 262.63 LBS | DIASTOLIC BLOOD PRESSURE: 83 MMHG

## 2023-06-22 DIAGNOSIS — D75.1 POLYCYTHEMIA: ICD-10-CM

## 2023-06-22 DIAGNOSIS — Z28.39 RUBELLA NON-IMMUNE STATUS, ANTEPARTUM: ICD-10-CM

## 2023-06-22 DIAGNOSIS — O09.30 INSUFFICIENT ANTEPARTUM CARE: ICD-10-CM

## 2023-06-22 DIAGNOSIS — B00.9 HUMAN HERPES SIMPLEX VIRUS TYPE 1 (HSV-1) DNA DETECTED: ICD-10-CM

## 2023-06-22 DIAGNOSIS — O09.899 RUBELLA NON-IMMUNE STATUS, ANTEPARTUM: ICD-10-CM

## 2023-06-22 DIAGNOSIS — Z3A.37 37 WEEKS GESTATION OF PREGNANCY: Primary | ICD-10-CM

## 2023-06-22 PROBLEM — Z3A.36 36 WEEKS GESTATION OF PREGNANCY: Status: RESOLVED | Noted: 2023-06-16 | Resolved: 2023-06-22

## 2023-06-22 PROCEDURE — 59425 ANTEPARTUM CARE ONLY: CPT | Mod: TH,S$GLB,,

## 2023-06-22 PROCEDURE — 59425 PR ANTEPARTUM CARE ONLY, 4-6 VISITS: ICD-10-PCS | Mod: TH,S$GLB,,

## 2023-06-22 RX ORDER — FAMOTIDINE 40 MG/1
40 TABLET, FILM COATED ORAL 2 TIMES DAILY
COMMUNITY
Start: 2023-06-21

## 2023-06-22 NOTE — PROGRESS NOTES
2023  23 y.o. 37w3d per u/s at 9w3d weeks EGA with [facility]. ROGER 07/10/2023. Dating u/s report scanned into media.   OB History    Para Term  AB Living   1             SAB IAB Ectopic Multiple Live Births                  # Outcome Date GA Lbr Bennie/2nd Weight Sex Delivery Anes PTL Lv   1 Current              Here for scheduled JANET visit. Doing well.  No lof/brvb, dysuria, fever/chills, nausea or emesis. No S&S of pre eclampsia or COIVD 19. Good FM noted. No cramps/regular contractions. Calm, pleasant, NAD. ROS negative with exception of aforementioned:    Patient reports recurrent vaginal bumps that come and go at her Initial OB visit. HSV serum labs positive for HSV 1&2. Pt taking suppressive valtrex. No outbreaks since beginning valtrex.    Fetal growth at 36w0d EFW 6#10oz 53%; AC 95%. AC not 3 weeks ahead. MFM not concerned for fetal macrosomia.     Patient has not been seen since 27 weeks. Patient states she has been resting at home. Importance of regular prenatal care stressed. Reviewed that as patient is now 36 weeks her appts are to occur weekly. Patient verbalizes understanding and will be present for prenatal appts. Patient still receiving weekly BPP due to 34 weeks.     TDAP given on 06/15/2023.      Allergies:  None    PMH:  Denies major medical illness or injury    Surghx:  None    OBHX:    Late entry to care at 17 weeks  BMI 40 at Initial OB. Plan growth 32 weeks; weekly BPP beginning at 34 weeks.   HSV 1&2. On suppression therapy.  Rubella Non-immune.    LABS:  O POS/ABS Neg  CBC 10.2/32.7/484  Rubella Non-Immune  Sickle Cell Neg  HIV Neg  Hep A B C Neg  RPR Non-Reactive  HSV serum + 1&2    NIPT Low Risk x 3; Female  AFP Negative    Urine Cx No Growth  GC/CHL Neg  UDS + THC    Pap, GC/CHL, Vaginosis, Urine Cx today.     CBC at 27 weeks 11.0/34.1/405    GBS Pos  RPR at 36 weeks Non-Reactive    Review of Systems:  General ROS: negative for headache or visual  changes  Breast ROS: negative for breast lumps  Gastrointestinal ROS: negative for constipation, diarrhea or nausea/vomiting  Musculoskeletal ROS: negative for pain in joints or swelling in face or hands.   Neurological ROS: negative for - headaches, numbness/tingling or visual changes      Physical Exam:  /83   Pulse 102   Wt 119.1 kg (262 lb 9.6 oz)   LMP 09/26/2022 (Approximate)   BMI 45.08 kg/m²   FHT: Fetal Heart Rate: 140s  Urine Dip: neg/neg    Constitutional: She is oriented to person, place, and time. She appears well-developed and well-nourished. No distress.   Pulmonary/Chest: Effort normal. No respiratory distress  Abdominal: Soft, gravid, nontender. No rebound and no guarding. Fundal Height: Fundal Height (cm): 39 cm S>D  Genitourinary: VE: 0/0/-3 no blood or fluid on glove.  Musculoskeletal: Normal range of motion. Minimal peripheral edema.   Neurological: She is alert and oriented to person, place, and time. Coordination normal. Gait smooth and steady  Skin: Skin is warm and dry. She is not diaphoretic.  Psychiatric: She has a normal mood and affect.      Assessment:   23 y.o., at 37w3d  Patient Active Problem List   Diagnosis    Rubella non-immune status, antepartum    Insufficient antepartum care    Polycythemia    BMI 40.0-44.9, adult    Human herpes simplex virus type 1 (HSV-1) DNA detected    37 weeks gestation of pregnancy     Current Outpatient Medications on File Prior to Visit   Medication Sig Dispense Refill    aspirin (ECOTRIN) 81 MG EC tablet Take 1 tablet (81 mg total) by mouth once daily. 30 tablet 11    famotidine (PEPCID) 40 MG tablet Take 40 mg by mouth 2 (two) times daily.      ferrous sulfate 325 (65 FE) MG EC tablet Take 1 tablet (325 mg total) by mouth once daily. 30 tablet 11    PNV,calcium 72-iron-folic acid (PRENATAL VITAMIN PLUS LOW IRON) 27 mg iron- 1 mg Tab Take 1 tablet (1 each total) by mouth once daily. 30 tablet 11    valACYclovir (VALTREX) 500 MG  tablet Take 1 tablet (500 mg total) by mouth 2 (two) times daily. 60 tablet 11     No current facility-administered medications on file prior to visit.       Plan:  1. Oriented to Midwifery Care.   2. S&S of  PTL/PPROM.  3. Continue home meds/daily PNV.  4. ROGER 07/10/2023 per U/S at 9w3d.  5. NIPT Low Risk x 3; Female. AFP Neg.  6. Follow up growth u/s at 36 weeks 6#10oz 53%; AC 95%. Normal interval growth. MFM reports no suspicion of fetal macrosomia.   7. GTT/CBC WNL, reviewed and discussed with patient.   8. TDAP given on 06/15/2023.  9. GBS POS. Intrapartum abx reviewed and discussed.   10. Weekly JANET until delivery.

## 2023-07-03 ENCOUNTER — ROUTINE PRENATAL (OUTPATIENT)
Dept: OBSTETRICS AND GYNECOLOGY | Facility: CLINIC | Age: 23
End: 2023-07-03
Payer: MEDICAID

## 2023-07-03 ENCOUNTER — PROCEDURE VISIT (OUTPATIENT)
Dept: MATERNAL FETAL MEDICINE | Facility: CLINIC | Age: 23
End: 2023-07-03
Payer: MEDICAID

## 2023-07-03 VITALS
BODY MASS INDEX: 45.83 KG/M2 | HEART RATE: 101 BPM | DIASTOLIC BLOOD PRESSURE: 78 MMHG | SYSTOLIC BLOOD PRESSURE: 121 MMHG | WEIGHT: 267 LBS

## 2023-07-03 DIAGNOSIS — O09.899 RUBELLA NON-IMMUNE STATUS, ANTEPARTUM: ICD-10-CM

## 2023-07-03 DIAGNOSIS — B00.9 HUMAN HERPES SIMPLEX VIRUS TYPE 1 (HSV-1) DNA DETECTED: ICD-10-CM

## 2023-07-03 DIAGNOSIS — O09.30 INSUFFICIENT ANTEPARTUM CARE: ICD-10-CM

## 2023-07-03 DIAGNOSIS — Z28.39 RUBELLA NON-IMMUNE STATUS, ANTEPARTUM: ICD-10-CM

## 2023-07-03 DIAGNOSIS — D75.1 POLYCYTHEMIA: ICD-10-CM

## 2023-07-03 DIAGNOSIS — Z3A.39 39 WEEKS GESTATION OF PREGNANCY: Primary | ICD-10-CM

## 2023-07-03 PROBLEM — Z3A.37 37 WEEKS GESTATION OF PREGNANCY: Status: RESOLVED | Noted: 2023-06-22 | Resolved: 2023-07-03

## 2023-07-03 PROCEDURE — 76819 FETAL BIOPHYS PROFIL W/O NST: CPT | Mod: S$GLB,,, | Performed by: OBSTETRICS & GYNECOLOGY

## 2023-07-03 PROCEDURE — 59425 PR ANTEPARTUM CARE ONLY, 4-6 VISITS: ICD-10-PCS | Mod: TH,S$GLB,,

## 2023-07-03 PROCEDURE — 59425 ANTEPARTUM CARE ONLY: CPT | Mod: TH,S$GLB,,

## 2023-07-03 PROCEDURE — 80307 DRUG TEST PRSMV CHEM ANLYZR: CPT

## 2023-07-03 PROCEDURE — 76819 US OB/GYN PROCEDURE (VIEWPOINT): ICD-10-PCS | Mod: S$GLB,,, | Performed by: OBSTETRICS & GYNECOLOGY

## 2023-07-03 NOTE — PROGRESS NOTES
7/3/2023  23 y.o. 39w0d per u/s at 9w3d weeks EGA with [facility]. ROGER 07/10/2023. Dating u/s report scanned into media.   OB History    Para Term  AB Living   1             SAB IAB Ectopic Multiple Live Births                  # Outcome Date GA Lbr Bennie/2nd Weight Sex Delivery Anes PTL Lv   1 Current              Here for scheduled JANET visit. Doing well.  No lof/brvb, dysuria, fever/chills, nausea or emesis. No S&S of pre eclampsia or COIVD 19. Good FM noted. No cramps/regular contractions. Calm, pleasant, NAD. ROS negative with exception of aforementioned:    Patient reports recurrent vaginal bumps that come and go at her Initial OB visit. HSV serum labs positive for HSV 1&2. Pt taking suppressive valtrex. No outbreaks since beginning valtrex.    Fetal growth at 36w0d EFW 6#10oz 53%; AC 95%. AC not 3 weeks ahead. MFM not concerned for fetal macrosomia.     Patient has not been seen since 27 weeks. Patient states she has been resting at home. Importance of regular prenatal care stressed. Reviewed that as patient is now 36 weeks her appts are to occur weekly. Patient verbalizes understanding and will be present for prenatal appts. Patient still receiving weekly BPP due to 34 weeks.     TDAP given on 06/15/2023.    Allergies:  None    PMH:  Denies major medical illness or injury    Surghx:  None    OBHX:    Late entry to care at 17 weeks  BMI 40 at Initial OB. Plan growth 32 weeks; weekly BPP beginning at 34 weeks.   HSV 1&2. On suppression therapy.  Rubella Non-immune.    LABS:  O POS/ABS Neg  CBC 10.2/32.7/484  Rubella Non-Immune  Sickle Cell Neg  HIV Neg  Hep A B C Neg  RPR Non-Reactive  HSV serum + 1&2    NIPT Low Risk x 3; Female  AFP Negative    Urine Cx No Growth  GC/CHL Neg  UDS + THC    Pap, GC/CHL, Vaginosis, Urine Cx today.     CBC at 27 weeks 11.0/34.1/405    GBS Pos  RPR at 36 weeks Non-Reactive    Review of Systems:  General ROS: negative for headache or visual  changes  Breast ROS: negative for breast lumps  Gastrointestinal ROS: negative for constipation, diarrhea or nausea/vomiting  Musculoskeletal ROS: negative for pain in joints or swelling in face or hands.   Neurological ROS: negative for - headaches, numbness/tingling or visual changes      Physical Exam:  /78   Pulse 101   Wt 121.1 kg (267 lb)   LMP 09/26/2022 (Approximate)   BMI 45.83 kg/m²   FHT: Fetal Heart Rate: 140s  Urine Dip: neg/neg    Constitutional: She is oriented to person, place, and time. She appears well-developed and well-nourished. No distress.   Pulmonary/Chest: Effort normal. No respiratory distress  Abdominal: Soft, gravid, nontender. No rebound and no guarding. Fundal Height: Fundal Height (cm): 39 cm S=D  Genitourinary: VE: 0/0/-3 no blood or fluid on glove.  Musculoskeletal: Normal range of motion. Minimal peripheral edema.   Neurological: She is alert and oriented to person, place, and time. Coordination normal. Gait smooth and steady  Skin: Skin is warm and dry. She is not diaphoretic.  Psychiatric: She has a normal mood and affect.      Assessment:   23 y.o., at 39w0d  Patient Active Problem List   Diagnosis    Rubella non-immune status, antepartum    Insufficient antepartum care    Polycythemia    BMI 40.0-44.9, adult    Human herpes simplex virus type 1 (HSV-1) DNA detected     Current Outpatient Medications on File Prior to Visit   Medication Sig Dispense Refill    aspirin (ECOTRIN) 81 MG EC tablet Take 1 tablet (81 mg total) by mouth once daily. 30 tablet 11    famotidine (PEPCID) 40 MG tablet Take 40 mg by mouth 2 (two) times daily.      ferrous sulfate 325 (65 FE) MG EC tablet Take 1 tablet (325 mg total) by mouth once daily. 30 tablet 11    PNV,calcium 72-iron-folic acid (PRENATAL VITAMIN PLUS LOW IRON) 27 mg iron- 1 mg Tab Take 1 tablet (1 each total) by mouth once daily. 30 tablet 11    valACYclovir (VALTREX) 500 MG tablet Take 1 tablet (500 mg total) by mouth 2 (two)  times daily. 60 tablet 11     No current facility-administered medications on file prior to visit.       Plan:  Oriented to Midwifery Care.   S&S of  PTL/PPROM.  Continue home meds/daily PNV.  ROGER 07/10/2023 per U/S at 9w3d.  NIPT Low Risk x 3; Female. AFP Neg.  Follow up growth u/s at 36 weeks 6#10oz 53%; AC 95%. Normal interval growth. MFM reports no suspicion of fetal macrosomia.   GTT/CBC WNL, reviewed and discussed with patient.   TDAP given on 06/15/2023.  GBS POS. Intrapartum abx reviewed and discussed.   Weekly JANET until delivery.

## 2023-07-05 ENCOUNTER — PATIENT MESSAGE (OUTPATIENT)
Dept: RESEARCH | Facility: HOSPITAL | Age: 23
End: 2023-07-05
Payer: MEDICAID

## 2023-07-05 ENCOUNTER — PATIENT MESSAGE (OUTPATIENT)
Dept: OBSTETRICS AND GYNECOLOGY | Facility: CLINIC | Age: 23
End: 2023-07-05
Payer: MEDICAID

## 2023-07-05 DIAGNOSIS — B37.9 YEAST INFECTION: Primary | ICD-10-CM

## 2023-07-05 LAB
AMPHET+METHAMPHET UR QL: NEGATIVE
BARBITURATES UR QL SCN>200 NG/ML: NEGATIVE
BENZODIAZ UR QL SCN>200 NG/ML: NEGATIVE
BZE UR QL SCN: NEGATIVE
CANNABINOIDS UR QL SCN: NEGATIVE
CREAT UR-MCNC: 139.2 MG/DL (ref 15–325)
METHADONE UR QL SCN>300 NG/ML: NEGATIVE
OPIATES UR QL SCN: NEGATIVE
PCP UR QL SCN>25 NG/ML: NEGATIVE
TOXICOLOGY INFORMATION: NORMAL

## 2023-07-05 RX ORDER — FLUCONAZOLE 200 MG/1
200 TABLET ORAL DAILY
Qty: 3 TABLET | Refills: 0 | Status: SHIPPED | OUTPATIENT
Start: 2023-07-05 | End: 2023-07-08

## 2023-07-11 ENCOUNTER — ROUTINE PRENATAL (OUTPATIENT)
Dept: OBSTETRICS AND GYNECOLOGY | Facility: CLINIC | Age: 23
End: 2023-07-11
Payer: MEDICAID

## 2023-07-11 ENCOUNTER — PATIENT MESSAGE (OUTPATIENT)
Dept: RESEARCH | Facility: HOSPITAL | Age: 23
End: 2023-07-11
Payer: MEDICAID

## 2023-07-11 ENCOUNTER — PROCEDURE VISIT (OUTPATIENT)
Dept: MATERNAL FETAL MEDICINE | Facility: CLINIC | Age: 23
End: 2023-07-11
Payer: MEDICAID

## 2023-07-11 VITALS — DIASTOLIC BLOOD PRESSURE: 74 MMHG | WEIGHT: 268 LBS | BODY MASS INDEX: 46 KG/M2 | SYSTOLIC BLOOD PRESSURE: 122 MMHG

## 2023-07-11 DIAGNOSIS — N64.89 BILATERAL PENDULOUS BREASTS: ICD-10-CM

## 2023-07-11 DIAGNOSIS — Z3A.40 40 WEEKS GESTATION OF PREGNANCY: Primary | ICD-10-CM

## 2023-07-11 DIAGNOSIS — N62 LARGE BREASTS: ICD-10-CM

## 2023-07-11 DIAGNOSIS — D75.1 POLYCYTHEMIA: ICD-10-CM

## 2023-07-11 DIAGNOSIS — B00.9 HUMAN HERPES SIMPLEX VIRUS TYPE 1 (HSV-1) DNA DETECTED: ICD-10-CM

## 2023-07-11 DIAGNOSIS — O09.899 RUBELLA NON-IMMUNE STATUS, ANTEPARTUM: ICD-10-CM

## 2023-07-11 DIAGNOSIS — Z28.39 RUBELLA NON-IMMUNE STATUS, ANTEPARTUM: ICD-10-CM

## 2023-07-11 DIAGNOSIS — O09.30 INSUFFICIENT ANTEPARTUM CARE: ICD-10-CM

## 2023-07-11 PROCEDURE — 59425 PR ANTEPARTUM CARE ONLY, 4-6 VISITS: ICD-10-PCS | Mod: TH,S$GLB,,

## 2023-07-11 PROCEDURE — 76819 FETAL BIOPHYS PROFIL W/O NST: CPT | Mod: S$GLB,,, | Performed by: OBSTETRICS & GYNECOLOGY

## 2023-07-11 PROCEDURE — 76819 US OB/GYN PROCEDURE (VIEWPOINT): ICD-10-PCS | Mod: S$GLB,,, | Performed by: OBSTETRICS & GYNECOLOGY

## 2023-07-11 PROCEDURE — 59425 ANTEPARTUM CARE ONLY: CPT | Mod: TH,S$GLB,,

## 2023-07-11 NOTE — LETTER
July 11, 2023    Ayanna Alvarez  [address] [place] [address]              [facility] - Obstetrics [facility] Gynecology  [address] [facility] [place] [address]  Phone: [phone]  Fax: [phone]    To Whom It May Concern:    Ms. Alvarez is currently under our care for pregnancy.  Estimated Date of Delivery: 07/10/2023.  As of 07/11/2023 Ms. Alvarez is still pregnant at this time. Date of anticipated delivery is set for 07/17/2023.        Sincerely,     Esme Jefferson CNM

## 2023-07-11 NOTE — PROGRESS NOTES
Patient  with ROGER of 2023. Desires to breastfeed. Patient at risk for breastfeeding complications related to infant positioning and latching due to large pendulous breasts. She would highly benefit from breastpump. Rx sent to [facility] breast pumps.

## 2023-07-11 NOTE — LETTER
July 11, 2023    Ayanna Alvarez  [address] [place] [address]              [facility] - Obstetrics And Gynecology  [address] [facility] [address] [place] [address]  Phone: [phone]  Fax: [phone]    To Whom It May Concern:    Ms. Alvarez is currently under our care for pregnancy.  Estimated Date of Delivery: 07/10/2023.  As of 07/11/2023 Ms. Alvarez is still pregnant at this time. Date of anticipated delivery is set for 07/17/2023.        Sincerely,     Esme Jefferson CNM

## 2023-07-11 NOTE — PROGRESS NOTES
2023  23 y.o. 40w1d per u/s at 9w3d weeks EGA with [facility]. ROGER 07/10/2023. Dating u/s report scanned into media.   OB History    Para Term  AB Living   1             SAB IAB Ectopic Multiple Live Births                  # Outcome Date GA Lbr Bennie/2nd Weight Sex Delivery Anes PTL Lv   1 Current              Here for scheduled JANET visit. Doing well.  No lof/brvb, dysuria, fever/chills, nausea or emesis. No S&S of pre eclampsia or COIVD 19. Good FM noted. No cramps/regular contractions. Calm, pleasant, NAD. ROS negative with exception of aforementioned:    Patient reports recurrent vaginal bumps that come and go at her Initial OB visit. HSV serum labs positive for HSV 1&2. Pt taking suppressive valtrex. No outbreaks since beginning valtrex.    Fetal growth at 36w0d EFW 6#10oz 53%; AC 95%. AC not 3 weeks ahead. MFM not concerned for fetal macrosomia.     Patient has not been seen since 27 weeks. Patient states she has been resting at home. Importance of regular prenatal care stressed. Reviewed that as patient is now 36 weeks her appts are to occur weekly. Patient verbalizes understanding and will be present for prenatal appts. Patient still receiving weekly BPP due to maternal BMI > 40. All BPPs have been WNL .    TDAP given on 06/15/2023.    40w1d. VE 2 external os/1 internal os/60/-2 soft anterior. Vertex presentation. Patient desires first available IOL due to postdates. IOL discussed with Dr. Pereyra.     Allergies:  None    PMH:  Denies major medical illness or injury    Surghx:  None    OBHX:    Late entry to care at 17 weeks  BMI 40 at Initial OB. Plan growth 32 weeks; weekly BPP beginning at 34 weeks.   HSV 1&2. On suppression therapy. No outbreaks since beginning suppression.   Rubella Non-immune.  GBS +. Intrapartum abx reviewed and discussed.     LABS:  O POS/ABS Neg  CBC 10.2/32.7/484  Rubella Non-Immune  Sickle Cell Neg  HIV Neg  Hep A B C Neg  RPR Non-Reactive  HSV  serum + 1&2    NIPT Low Risk x 3; Female  AFP Negative    Urine Cx No Growth  GC/CHL Neg  UDS + THC    Pap, GC/CHL, Vaginosis, Urine Cx today.     CBC at 27 weeks 11.0/34.1/405    GBS Pos  RPR at 36 weeks Non-Reactive    Review of Systems:  General ROS: negative for headache or visual changes  Breast ROS: negative for breast lumps  Gastrointestinal ROS: negative for constipation, diarrhea or nausea/vomiting  Musculoskeletal ROS: negative for pain in joints or swelling in face or hands.   Neurological ROS: negative for - headaches, numbness/tingling or visual changes      Physical Exam:  /74   Wt 121.6 kg (268 lb)   LMP 09/26/2022 (Approximate)   BMI 46.00 kg/m²   FHT: Fetal Heart Rate: 130s  Urine Dip: neg/neg    Constitutional: She is oriented to person, place, and time. She appears well-developed and well-nourished. No distress.   Pulmonary/Chest: Effort normal. No respiratory distress  Abdominal: Soft, gravid, nontender. No rebound and no guarding. Fundal Height: Fundal Height (cm): 41 cm S>D  Genitourinary: VE: 1/60/-2 no blood or fluid on glove.  Musculoskeletal: Normal range of motion. Minimal peripheral edema.   Neurological: She is alert and oriented to person, place, and time. Coordination normal. Gait smooth and steady  Skin: Skin is warm and dry. She is not diaphoretic.  Psychiatric: She has a normal mood and affect.      Assessment:   23 y.o., at 40w1d  Patient Active Problem List   Diagnosis    Rubella non-immune status, antepartum    Insufficient antepartum care    Polycythemia    BMI 40.0-44.9, adult    Human herpes simplex virus type 1 (HSV-1) DNA detected    40 weeks gestation of pregnancy     Current Outpatient Medications on File Prior to Visit   Medication Sig Dispense Refill    aspirin (ECOTRIN) 81 MG EC tablet Take 1 tablet (81 mg total) by mouth once daily. 30 tablet 11    famotidine (PEPCID) 40 MG tablet Take 40 mg by mouth 2 (two) times daily.      ferrous sulfate  325 (65 FE) MG EC tablet Take 1 tablet (325 mg total) by mouth once daily. 30 tablet 11    PNV,calcium 72-iron-folic acid (PRENATAL VITAMIN PLUS LOW IRON) 27 mg iron- 1 mg Tab Take 1 tablet (1 each total) by mouth once daily. 30 tablet 11    valACYclovir (VALTREX) 500 MG tablet Take 1 tablet (500 mg total) by mouth 2 (two) times daily. 60 tablet 11     No current facility-administered medications on file prior to visit.       Plan:  1. Oriented to Midwifery Care.   2. S&S of  PTL/PPROM.  3. Continue home meds/daily PNV.  4. ROGER 07/10/2023 per U/S at 9w3d.  5. NIPT Low Risk x 3; Female. AFP Neg.  6. Follow up growth u/s at 36 weeks 6#10oz 53%; AC 95%. Normal interval growth. MFM reports no suspicion of fetal macrosomia.   7. GTT/CBC WNL, reviewed and discussed with patient.   8. TDAP given on 06/15/2023.  9. GBS POS. Intrapartum abx reviewed and discussed.   10. Weekly JANET until delivery.   11. Anticipate IOL on or prior to 41 weeks for postdates. Patient agreeable to plan of care.

## 2023-07-13 ENCOUNTER — OUTSIDE PLACE OF SERVICE (OUTPATIENT)
Dept: OBSTETRICS AND GYNECOLOGY | Facility: CLINIC | Age: 23
End: 2023-07-13
Payer: MEDICAID

## 2023-07-13 PROCEDURE — 59514 PR CESAREAN DELIVERY ONLY: ICD-10-PCS | Mod: TH,80,, | Performed by: OBSTETRICS & GYNECOLOGY

## 2023-07-13 PROCEDURE — 59514 CESAREAN DELIVERY ONLY: CPT | Mod: TH,80,, | Performed by: OBSTETRICS & GYNECOLOGY

## 2023-07-19 ENCOUNTER — PATIENT MESSAGE (OUTPATIENT)
Dept: RESEARCH | Facility: HOSPITAL | Age: 23
End: 2023-07-19
Payer: MEDICAID

## 2023-07-24 ENCOUNTER — PATIENT MESSAGE (OUTPATIENT)
Dept: RESEARCH | Facility: HOSPITAL | Age: 23
End: 2023-07-24
Payer: MEDICAID

## 2023-07-26 ENCOUNTER — TELEPHONE (OUTPATIENT)
Dept: OBSTETRICS AND GYNECOLOGY | Facility: CLINIC | Age: 23
End: 2023-07-26
Payer: MEDICAID

## 2023-07-26 NOTE — TELEPHONE ENCOUNTER
Called pt , no answer no voicemail set up.    ----- Message from Tatiana Antonio sent at 7/26/2023  1:59 PM CDT -----  Name of Who is Calling: pt        What is the request in detail: pt is needing to reschedule post op appt. Please assist with this matter.        Can the clinic reply by [facility]: no        What Number to Call Back if not in [facility]: [phone]

## 2023-08-01 ENCOUNTER — OFFICE VISIT (OUTPATIENT)
Dept: OBSTETRICS AND GYNECOLOGY | Facility: CLINIC | Age: 23
End: 2023-08-01
Payer: MEDICAID

## 2023-08-01 VITALS
BODY MASS INDEX: 41.94 KG/M2 | WEIGHT: 245.63 LBS | DIASTOLIC BLOOD PRESSURE: 67 MMHG | SYSTOLIC BLOOD PRESSURE: 119 MMHG | HEIGHT: 64 IN

## 2023-08-01 DIAGNOSIS — T81.49XA INCISIONAL INFECTION: ICD-10-CM

## 2023-08-01 DIAGNOSIS — B37.2 YEAST DERMATITIS: ICD-10-CM

## 2023-08-01 DIAGNOSIS — Z98.890 POSTOPERATIVE STATE: Primary | ICD-10-CM

## 2023-08-01 PROCEDURE — 3008F BODY MASS INDEX DOCD: CPT | Mod: CPTII,S$GLB,,

## 2023-08-01 PROCEDURE — 3078F DIAST BP <80 MM HG: CPT | Mod: CPTII,S$GLB,,

## 2023-08-01 PROCEDURE — 3074F PR MOST RECENT SYSTOLIC BLOOD PRESSURE < 130 MM HG: ICD-10-PCS | Mod: CPTII,S$GLB,,

## 2023-08-01 PROCEDURE — 3078F PR MOST RECENT DIASTOLIC BLOOD PRESSURE < 80 MM HG: ICD-10-PCS | Mod: CPTII,S$GLB,,

## 2023-08-01 PROCEDURE — 1159F MED LIST DOCD IN RCRD: CPT | Mod: CPTII,S$GLB,,

## 2023-08-01 PROCEDURE — 0503F PR POSTPARTUM CARE VISIT: ICD-10-PCS | Mod: S$GLB,,,

## 2023-08-01 PROCEDURE — 0503F POSTPARTUM CARE VISIT: CPT | Mod: S$GLB,,,

## 2023-08-01 PROCEDURE — 3074F SYST BP LT 130 MM HG: CPT | Mod: CPTII,S$GLB,,

## 2023-08-01 PROCEDURE — 1159F PR MEDICATION LIST DOCUMENTED IN MEDICAL RECORD: ICD-10-PCS | Mod: CPTII,S$GLB,,

## 2023-08-01 PROCEDURE — 3008F PR BODY MASS INDEX (BMI) DOCUMENTED: ICD-10-PCS | Mod: CPTII,S$GLB,,

## 2023-08-01 RX ORDER — NYSTATIN 100000 [USP'U]/G
POWDER TOPICAL
Qty: 60 G | Refills: 2 | Status: SHIPPED | OUTPATIENT
Start: 2023-08-01 | End: 2024-07-31

## 2023-08-01 RX ORDER — CEPHALEXIN 500 MG/1
500 CAPSULE ORAL EVERY 12 HOURS
Qty: 20 CAPSULE | Refills: 0 | Status: SHIPPED | OUTPATIENT
Start: 2023-08-01 | End: 2023-08-10 | Stop reason: ALTCHOICE

## 2023-08-03 PROBLEM — T81.49XA INCISIONAL INFECTION: Status: ACTIVE | Noted: 2023-08-03

## 2023-08-03 PROBLEM — O09.30 INSUFFICIENT ANTEPARTUM CARE: Status: RESOLVED | Noted: 2023-01-27 | Resolved: 2023-08-03

## 2023-08-03 PROBLEM — Z3A.40 40 WEEKS GESTATION OF PREGNANCY: Status: RESOLVED | Noted: 2023-07-11 | Resolved: 2023-08-03

## 2023-08-04 NOTE — PROGRESS NOTES
CC: Post-partum follow-up    Ayanna Alvarez is a 23 y.o. female  who presents for post-op visit. She is 2 weeks S/P  section.Primary LTCS for fetal bradycardia.  Patient without complaints.  Pain controlled.  Tolerating p.o.  Pain controlled. Positive ambulation. Positive flatus.  Bleeding is controlled.  No depression.  No abuse.    Incision is healing well. It is completely intact. There is no discharge or drainage from the incision. On the right exterior side of the incision there is noticeable erythema. Patient endorses pain, itching, and skin irritation around incision. Denies discharge from incision, fevers or chills. Patient is morbidly obese and incision is under panus which is wet from perspiration. Area around incision does have odor. Suspicious of mild incisional infection at this time. I have discussed this with the patient. Given she is mostly asymptomatic. I have recommended BID Keflex over a 10 day time frame with BID Nystatin powder around incision area as well. Hygiene for C/S incision discussed. Patient is to shower and use antibacterial soap. She should pat NOT wipe incision DRY. Reviewed importance of keeping area dry, nystatin powder should be helpful with this. Patient verbalizes understanding of teaching.     Patient to follow up in 10 days for re-evaluation of incision.     Delivery Date: 2023  Delivery MD: Dr. Daryl Mckoy  Incision: LTCS  Infant Wt: 7#10oz  Gender: female  Breast Feeding: breast  Depression: No  Contraception: no method  Facility: [facility]    Pregnancy was complicated by:  HSV, BMI > 40, Insufficient Prenatal Care      Current Outpatient Medications:     aspirin (ECOTRIN) 81 MG EC tablet, Take 1 tablet (81 mg total) by mouth once daily., Disp: 30 tablet, Rfl: 11    famotidine (PEPCID) 40 MG tablet, Take 40 mg by mouth 2 (two) times daily., Disp: , Rfl:     ferrous sulfate 325 (65 FE) MG EC tablet, Take 1 tablet (325 mg total) by mouth  "once daily., Disp: 30 tablet, Rfl: 11    PNV,calcium 72-iron-folic acid (PRENATAL VITAMIN PLUS LOW IRON) 27 mg iron- 1 mg Tab, Take 1 tablet (1 each total) by mouth once daily., Disp: 30 tablet, Rfl: 11    valACYclovir (VALTREX) 500 MG tablet, Take 1 tablet (500 mg total) by mouth 2 (two) times daily., Disp: 60 tablet, Rfl: 11    cephALEXin (KEFLEX) 500 MG capsule, Take 1 capsule (500 mg total) by mouth every 12 (twelve) hours. for 10 days, Disp: 20 capsule, Rfl: 0    nystatin (MYCOSTATIN) powder, Apply to affected area 3 times daily, Disp: 60 g, Rfl: 2     ROS:  GENERAL: No fever, chills, fatigability.  VULVAR: No pain, no lesions and no itching.  VAGINAL: No relaxation, no itching, no discharge, no abnormal bleeding and no lesions.  ABDOMEN: No abdominal pain. Denies nausea. Denies vomiting. No diarrhea. No constipation  BREAST: Denies pain. No lumps.  URINARY: No incontinence, no nocturia, no frequency and no dysuria.  CARDIOVASCULAR: No chest pain. No shortness of breath. No leg cramps.  NEUROLOGICAL: No headaches. No vision changes.      PHYSICAL EXAM:  /67   Ht 5' 4" (1.626 m)   Wt 111.4 kg (245 lb 9.6 oz)   LMP 2022 (Approximate)   Breastfeeding Yes   BMI 42.16 kg/m²      Exam chaperoned by nurse  General:  Well-developed, well-nourished female in no acute distress  HEENT:  Normocephalic, atraumatic, extraocular muscles intact  Breasts:  Nontender, no masses, bilaterally symmetric  Abdomen:  Soft, nontender, nondistended, fundus firm and below umbilicus, incision see HPI  Extremities:  Warm, dry, no clubbing/cyanosis/edema  Neurologic:  Cranial nerves 2-12 grossly intact  Dermatologic:  No rashes/lesions/bruising    IMP:  Status post  section    PLAN:  Doing well.C/S incision infection. Tx with BID keflex and nystatin powder. Follow up in 10 days for incision re-evaluation.     "

## 2023-08-10 ENCOUNTER — OFFICE VISIT (OUTPATIENT)
Dept: OBSTETRICS AND GYNECOLOGY | Facility: CLINIC | Age: 23
End: 2023-08-10
Payer: MEDICAID

## 2023-08-10 VITALS
BODY MASS INDEX: 42.27 KG/M2 | SYSTOLIC BLOOD PRESSURE: 106 MMHG | DIASTOLIC BLOOD PRESSURE: 60 MMHG | WEIGHT: 247.63 LBS | HEIGHT: 64 IN

## 2023-08-10 DIAGNOSIS — T81.49XA INFECTED INCISION: Primary | ICD-10-CM

## 2023-08-10 DIAGNOSIS — F43.10 PTSD (POST-TRAUMATIC STRESS DISORDER): ICD-10-CM

## 2023-08-10 PROBLEM — D75.1 POLYCYTHEMIA: Status: RESOLVED | Noted: 2023-03-13 | Resolved: 2023-08-10

## 2023-08-10 PROBLEM — O09.899 RUBELLA NON-IMMUNE STATUS, ANTEPARTUM: Status: RESOLVED | Noted: 2023-01-27 | Resolved: 2023-08-10

## 2023-08-10 PROBLEM — Z28.39 RUBELLA NON-IMMUNE STATUS, ANTEPARTUM: Status: RESOLVED | Noted: 2023-01-27 | Resolved: 2023-08-10

## 2023-08-10 PROCEDURE — 99214 PR OFFICE/OUTPT VISIT, EST, LEVL IV, 30-39 MIN: ICD-10-PCS | Mod: 24,S$GLB,,

## 2023-08-10 PROCEDURE — 3074F PR MOST RECENT SYSTOLIC BLOOD PRESSURE < 130 MM HG: ICD-10-PCS | Mod: CPTII,S$GLB,,

## 2023-08-10 PROCEDURE — 3078F DIAST BP <80 MM HG: CPT | Mod: CPTII,S$GLB,,

## 2023-08-10 PROCEDURE — 1159F PR MEDICATION LIST DOCUMENTED IN MEDICAL RECORD: ICD-10-PCS | Mod: CPTII,S$GLB,,

## 2023-08-10 PROCEDURE — 3074F SYST BP LT 130 MM HG: CPT | Mod: CPTII,S$GLB,,

## 2023-08-10 PROCEDURE — 99214 OFFICE O/P EST MOD 30 MIN: CPT | Mod: 24,S$GLB,,

## 2023-08-10 PROCEDURE — 3008F PR BODY MASS INDEX (BMI) DOCUMENTED: ICD-10-PCS | Mod: CPTII,S$GLB,,

## 2023-08-10 PROCEDURE — 3078F PR MOST RECENT DIASTOLIC BLOOD PRESSURE < 80 MM HG: ICD-10-PCS | Mod: CPTII,S$GLB,,

## 2023-08-10 PROCEDURE — 3008F BODY MASS INDEX DOCD: CPT | Mod: CPTII,S$GLB,,

## 2023-08-10 PROCEDURE — 1159F MED LIST DOCD IN RCRD: CPT | Mod: CPTII,S$GLB,,

## 2023-08-10 RX ORDER — FLUOXETINE 10 MG/1
10 CAPSULE ORAL DAILY
Qty: 30 CAPSULE | Refills: 11 | Status: SHIPPED | OUTPATIENT
Start: 2023-08-10 | End: 2023-09-13
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
## 2023-09-13 ENCOUNTER — POSTPARTUM VISIT (OUTPATIENT)
Dept: OBSTETRICS AND GYNECOLOGY | Facility: CLINIC | Age: 23
End: 2023-09-13
Payer: MEDICAID

## 2023-09-13 VITALS
BODY MASS INDEX: 43.23 KG/M2 | SYSTOLIC BLOOD PRESSURE: 122 MMHG | WEIGHT: 244 LBS | HEIGHT: 63 IN | DIASTOLIC BLOOD PRESSURE: 75 MMHG

## 2023-09-13 DIAGNOSIS — R76.8 HSV-2 SEROPOSITIVE: ICD-10-CM

## 2023-09-13 DIAGNOSIS — N89.8 VAGINAL ODOR: ICD-10-CM

## 2023-09-13 DIAGNOSIS — B00.9 HUMAN HERPES SIMPLEX VIRUS TYPE 1 (HSV-1) DNA DETECTED: ICD-10-CM

## 2023-09-13 DIAGNOSIS — N89.8 VAGINAL DISCHARGE: ICD-10-CM

## 2023-09-13 PROCEDURE — 81514 NFCT DS BV&VAGINITIS DNA ALG: CPT

## 2023-09-13 PROCEDURE — 0503F POSTPARTUM CARE VISIT: CPT | Mod: CPTII,S$GLB,,

## 2023-09-13 PROCEDURE — 0503F PR POSTPARTUM CARE VISIT: ICD-10-PCS | Mod: CPTII,S$GLB,,

## 2023-09-13 RX ORDER — VALACYCLOVIR HYDROCHLORIDE 500 MG/1
500 TABLET, FILM COATED ORAL 2 TIMES DAILY
Qty: 60 TABLET | Refills: 11 | Status: SHIPPED | OUTPATIENT
Start: 2023-09-13 | End: 2024-09-12

## 2023-09-13 NOTE — PROGRESS NOTES
CC: Post-partum follow-up    Ayanna Alvarez is a 23 y.o. female  who presents for post-op visit. She is 9 weeks S/P  section. She is here for postpartum exam. Lochia resolved. No pain. No depression at this time. Breastfeeding. Having some vaginal discharge and odors. Otherwise no complaints.     Vaginosis swab completed. Will await results.     Patient no longer taking prozac. Patient states her depression has resolved. She is now feeling joyful and inspired. 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 She does still desire counseling to continue healing and feel better. Contact info for counselors in her insurance network provided.     Not sexually active. Breastfeeding. Does not want contraception at this time. When she is done breastfeeding patient is interested in COCs. Progestin only pills offered, patient declines as she does not like irregular menses side effect.       Current Outpatient Medications:     famotidine (PEPCID) 40 MG tablet, Take 40 mg by mouth 2 (two) times daily., Disp: , Rfl:     ferrous sulfate 325 (65 FE) MG EC tablet, Take 1 tablet (325 mg total) by mouth once daily., Disp: 30 tablet, Rfl: 11    nystatin (MYCOSTATIN) powder, Apply to affected area 3 times daily, Disp: 60 g, Rfl: 2    PNV,calcium 72-iron-folic acid (PRENATAL VITAMIN PLUS LOW IRON) 27 mg iron- 1 mg Tab, Take 1 tablet (1 each total) by mouth once daily., Disp: 30 tablet, Rfl: 11    valACYclovir (VALTREX) 500 MG tablet, Take 1 tablet (500 mg total) by mouth 2 (two) times daily., Disp: 60 tablet, Rfl: 11     ROS:  GENERAL: No fever, chills, fatigability.  VULVAR: No pain, no lesions and no itching.  VAGINAL: No relaxation, no itching, no abnormal bleeding and no lesions. + discharge and odor x 1 week  ABDOMEN: No abdominal pain. Denies nausea. Denies vomiting. No diarrhea. No constipation  BREAST: Denies pain. No lumps.  URINARY: No incontinence, no nocturia, no frequency and no  "dysuria.  CARDIOVASCULAR: No chest pain. No shortness of breath. No leg cramps.  NEUROLOGICAL: No headaches. No vision changes.      PHYSICAL EXAM:  /75 (BP Location: Left arm, Patient Position: Sitting)   Ht 5' 3" (1.6 m)   Wt 110.7 kg (244 lb)   LMP 2022 (Approximate)   Breastfeeding Yes   BMI 43.22 kg/m²      Exam chaperoned by nurse  General:  Well-developed, well-nourished female in no acute distress  HEENT:  Normocephalic, atraumatic, extraocular muscles intact  Breasts:  Nontender, no masses, bilaterally symmetric  Abdomen:  Soft, nontender, nondistended, fundus firm and below umbilicus, incision fully healed, only a scar.   Extremities:  Warm, dry, no clubbing/cyanosis/edema  Neurologic:  Cranial nerves 2-12 grossly intact  Dermatologic:  No rashes/lesions/bruising    IMP:  Status post  section    PLAN:  Doing well.C/S incision healing well. Postpartum depression has improved. Vaginosis today. Will follow up at later time for contraception.     "

## 2023-09-15 LAB
BACTERIAL VAGINOSIS DNA: POSITIVE
CANDIDA GLABRATA DNA: NEGATIVE
CANDIDA KRUSEI DNA: NEGATIVE
CANDIDA RRNA VAG QL PROBE: NEGATIVE
T VAGINALIS RRNA GENITAL QL PROBE: NEGATIVE

## 2023-09-18 DIAGNOSIS — N76.0 BACTERIAL VAGINOSIS: Primary | ICD-10-CM

## 2023-09-18 DIAGNOSIS — B96.89 BACTERIAL VAGINOSIS: Primary | ICD-10-CM

## 2023-09-18 RX ORDER — METRONIDAZOLE 500 MG/1
500 TABLET ORAL EVERY 12 HOURS
Qty: 14 TABLET | Refills: 0 | Status: SHIPPED | OUTPATIENT
Start: 2023-09-18 | End: 2023-09-25

## 2023-10-02 DIAGNOSIS — B96.89 BACTERIAL VAGINOSIS: Primary | ICD-10-CM

## 2023-10-02 DIAGNOSIS — N76.0 BACTERIAL VAGINOSIS: Primary | ICD-10-CM

## 2023-10-02 RX ORDER — CLINDAMYCIN HYDROCHLORIDE 300 MG/1
300 CAPSULE ORAL 2 TIMES DAILY
Qty: 14 CAPSULE | Refills: 0 | Status: SHIPPED | OUTPATIENT
Start: 2023-10-02 | End: 2023-10-09

## 2023-10-04 ENCOUNTER — PATIENT MESSAGE (OUTPATIENT)
Dept: OBSTETRICS AND GYNECOLOGY | Facility: CLINIC | Age: 23
End: 2023-10-04
Payer: MEDICAID

## 2025-01-17 NOTE — PROGRESS NOTES
4/10/2023  23 y.o. 27w0d per u/s at 9w3d weeks [facility] with [facility]. ROGER 07/10/2023. Dating u/s report scanned into media.   OB History    Para Term  AB Living   1             SAB IAB Ectopic Multiple Live Births                  # Outcome Date GA Lbr Bennie/2nd Weight Sex Delivery Anes PTL Lv   1 Current              Here for scheduled JANET visit. Doing well.  No lof/brvb, dysuria, fever/chills, nausea or emesis. No S&S of pre eclampsia or COIVD 19. Good FM noted. No cramps/regular contractions. Calm, pleasant, NAD. ROS negative with exception of aforementioned:    Patient reports recurrent vaginal bumps that come and go at her Initial OB visit. HSV serum labs positive for HSV 1&2. Pt taking suppressive valtrex.     Anatomy and growth u/s completed on 2023. Fetal growth on low end of normal and cerebellum measuring 9 days behind. Follow up growth on 2023 shows EFW at 24 weeks is 1#6oz 28%. Normal interval growth with normal anatomy. Follow up growth ordered and scheduled for 05/15/2023. We also discussed due to BMI > 40 patient will begin weekly BPP at 34 weeks. Patient verbalizes understanding.     Pt reports history of tonsilitis. Pt states she feels like her tonsils are swollen right now. This has been exacerbated by pregnancy. Pt states she had cold last month. Patient has been referred to ENT.     Patient reporting vaginal discharge with odor. Denies vaginal itching. Reporting some dysuria. Normal STD screening at initial OB visit. + HSV however no lesions present. Vaginosis and GC/CHL collected today. Patient states she has never had pap smear before and would like to complete this today as well. Pap smear completed. Urine cx collected due to dysuria.     Allergies:  None    PMH:  Denies major medical illness or injury    Surghx:  None    OBHX:    Late entry to care at 17 weeks  BMI 40 at Initial OB. Plan growth 32 weeks; weekly BPP beginning at 34 weeks.   HSV 1&2. On  Patient wants us to know that she does have two refills left.    Patient tel [phone]   suppression therapy.  Rubella Non-immune.    LABS:  O POS/ABS Neg  CBC 10.2/32.7/484  Rubella Non-Immune  Sickle Cell Neg  HIV Neg  Hep A B C Neg  RPR Non-Reactive  HSV serum + 1&2    NIPT Low Risk x 3; Female  AFP Negative    Urine Cx No Growth  GC/CHL Neg  UDS + THC    Pap, GC/CHL, Vaginosis, Urine Cx today.   GTT/CBC today.     Review of Systems:  General ROS: negative for headache or visual changes  Breast ROS: negative for breast lumps  Gastrointestinal ROS: negative for constipation, diarrhea or nausea/vomiting  Musculoskeletal ROS: negative for pain in joints or swelling in face or hands.   Neurological ROS: negative for - headaches, numbness/tingling or visual changes      Physical Exam:  /67   Wt 115.1 kg (253 lb 11.2 oz)   LMP 09/26/2022 (Approximate)   BMI 43.55 kg/m²   FHT: Fetal Heart Rate: 150s  Urine Dip: neg/neg    Constitutional: She is oriented to person, place, and time. She appears well-developed and well-nourished. No distress.   Pulmonary/Chest: Effort normal. No respiratory distress  Abdominal: Soft, gravid, nontender. No rebound and no guarding. Fundal Height: Fundal Height (cm): 27 cmS=D  Genitourinary: Deferred   Musculoskeletal: Normal range of motion. Minimal peripheral edema.   Neurological: She is alert and oriented to person, place, and time. Coordination normal. Gait smooth and steady  Skin: Skin is warm and dry. She is not diaphoretic.  Psychiatric: She has a normal mood and affect.      Assessment:   23 y.o., at 27w0d  Patient Active Problem List   Diagnosis    Rubella non-immune status, antepartum    Insufficient antepartum care    Polycythemia    BMI 40.0-44.9, adult    Antepartum anemia    Human herpes simplex virus type 1 (HSV-1) DNA detected    27 weeks gestation of pregnancy     Current Outpatient Medications on File Prior to Visit   Medication Sig Dispense Refill    aspirin (ECOTRIN) 81 MG EC tablet Take 1 tablet (81 mg total) by mouth once daily. 30 tablet 11     ferrous sulfate 325 (65 FE) MG EC tablet Take 1 tablet (325 mg total) by mouth once daily. 30 tablet 11    valACYclovir (VALTREX) 500 MG tablet Take 1 tablet (500 mg total) by mouth 2 (two) times daily. 60 tablet 11     No current facility-administered medications on file prior to visit.       Plan:  Oriented to [facility].   S&S of  PTL/PPROM.  Continue home meds/daily PNV.  ROGER 07/10/2023 per U/S at 9w3d.  NIPT Low Risk x 3; Female. AFP Neg.  Follow up growth u/s at 24 weeks 1#6oz 28%. Normal interval growth. Next growth on 05/15/2023.  GTT/CBC today.  JANET 3 weeks.